# Patient Record
Sex: MALE | Race: WHITE | Employment: OTHER | URBAN - METROPOLITAN AREA
[De-identification: names, ages, dates, MRNs, and addresses within clinical notes are randomized per-mention and may not be internally consistent; named-entity substitution may affect disease eponyms.]

---

## 2020-09-01 ENCOUNTER — TELEPHONE (OUTPATIENT)
Dept: FAMILY MEDICINE CLINIC | Age: 60
End: 2020-09-01

## 2020-09-01 ENCOUNTER — OFFICE VISIT (OUTPATIENT)
Dept: FAMILY MEDICINE CLINIC | Age: 60
End: 2020-09-01
Payer: COMMERCIAL

## 2020-09-01 VITALS
OXYGEN SATURATION: 99 % | TEMPERATURE: 97.7 F | DIASTOLIC BLOOD PRESSURE: 73 MMHG | HEART RATE: 116 BPM | BODY MASS INDEX: 25.51 KG/M2 | SYSTOLIC BLOOD PRESSURE: 124 MMHG | RESPIRATION RATE: 24 BRPM | HEIGHT: 74 IN | WEIGHT: 198.8 LBS

## 2020-09-01 DIAGNOSIS — Z11.59 ENCOUNTER FOR HEPATITIS C SCREENING TEST FOR LOW RISK PATIENT: ICD-10-CM

## 2020-09-01 DIAGNOSIS — I63.9 CEREBROVASCULAR ACCIDENT (CVA), UNSPECIFIED MECHANISM (HCC): Primary | ICD-10-CM

## 2020-09-01 DIAGNOSIS — Z97.8 FOLEY CATHETER IN PLACE: ICD-10-CM

## 2020-09-01 DIAGNOSIS — N40.1 BENIGN PROSTATIC HYPERPLASIA WITH URINARY OBSTRUCTION: ICD-10-CM

## 2020-09-01 DIAGNOSIS — Z13.6 SCREENING FOR CARDIOVASCULAR CONDITION: ICD-10-CM

## 2020-09-01 DIAGNOSIS — Z79.4 CONTROLLED TYPE 2 DIABETES MELLITUS WITH COMPLICATION, WITH LONG-TERM CURRENT USE OF INSULIN (HCC): ICD-10-CM

## 2020-09-01 DIAGNOSIS — I73.9 PERIPHERAL ARTERIAL DISEASE (HCC): ICD-10-CM

## 2020-09-01 DIAGNOSIS — I95.9 HYPOTENSION, UNSPECIFIED HYPOTENSION TYPE: ICD-10-CM

## 2020-09-01 DIAGNOSIS — K59.00 CONSTIPATION, UNSPECIFIED CONSTIPATION TYPE: ICD-10-CM

## 2020-09-01 DIAGNOSIS — E11.8 CONTROLLED TYPE 2 DIABETES MELLITUS WITH COMPLICATION, WITH LONG-TERM CURRENT USE OF INSULIN (HCC): ICD-10-CM

## 2020-09-01 DIAGNOSIS — N13.8 BENIGN PROSTATIC HYPERPLASIA WITH URINARY OBSTRUCTION: ICD-10-CM

## 2020-09-01 LAB
EST. AVERAGE GLUCOSE BLD GHB EST-MCNC: 223 MG/DL
HBA1C MFR BLD: 9.4 % (ref 4–5.6)
PSA SERPL-MCNC: 2 NG/ML (ref 0.01–4)

## 2020-09-01 PROCEDURE — 99214 OFFICE O/P EST MOD 30 MIN: CPT | Performed by: FAMILY MEDICINE

## 2020-09-01 RX ORDER — INSULIN GLARGINE 100 [IU]/ML
INJECTION, SOLUTION SUBCUTANEOUS
Qty: 3 ADJUSTABLE DOSE PRE-FILLED PEN SYRINGE | Refills: 5 | Status: SHIPPED | OUTPATIENT
Start: 2020-09-01 | End: 2020-10-27 | Stop reason: SDUPTHER

## 2020-09-01 RX ORDER — METFORMIN HYDROCHLORIDE 850 MG/1
TABLET ORAL
COMMUNITY
Start: 2020-08-26 | End: 2020-09-01 | Stop reason: SDUPTHER

## 2020-09-01 RX ORDER — MIDODRINE HYDROCHLORIDE 5 MG/1
5 TABLET ORAL
Qty: 90 TAB | Refills: 1 | Status: SHIPPED | OUTPATIENT
Start: 2020-09-01 | End: 2020-09-15 | Stop reason: SDUPTHER

## 2020-09-01 RX ORDER — MIDODRINE HYDROCHLORIDE 5 MG/1
TABLET ORAL
COMMUNITY
Start: 2020-08-26 | End: 2020-09-01 | Stop reason: SDUPTHER

## 2020-09-01 RX ORDER — CLOPIDOGREL BISULFATE 75 MG/1
75 TABLET ORAL
COMMUNITY
Start: 2020-08-26 | End: 2020-09-01 | Stop reason: SDUPTHER

## 2020-09-01 RX ORDER — METFORMIN HYDROCHLORIDE 850 MG/1
TABLET ORAL
Qty: 180 TAB | Refills: 1 | Status: SHIPPED | OUTPATIENT
Start: 2020-09-01 | End: 2021-04-12

## 2020-09-01 RX ORDER — GLIPIZIDE 5 MG/1
TABLET ORAL
COMMUNITY
Start: 2020-08-26 | End: 2020-09-01 | Stop reason: SDUPTHER

## 2020-09-01 RX ORDER — SENNOSIDES 8.6 MG/1
1 TABLET ORAL DAILY
Qty: 90 TAB | Refills: 5 | Status: SHIPPED | OUTPATIENT
Start: 2020-09-01 | End: 2021-05-03

## 2020-09-01 RX ORDER — ASPIRIN 325 MG
325 TABLET ORAL DAILY
Qty: 90 TAB | Refills: 5 | Status: SHIPPED | OUTPATIENT
Start: 2020-09-01 | End: 2021-05-03

## 2020-09-01 RX ORDER — TAMSULOSIN HYDROCHLORIDE 0.4 MG/1
CAPSULE ORAL
Qty: 90 CAP | Refills: 1 | Status: SHIPPED | OUTPATIENT
Start: 2020-09-01 | End: 2020-12-30 | Stop reason: SDUPTHER

## 2020-09-01 RX ORDER — TAMSULOSIN HYDROCHLORIDE 0.4 MG/1
CAPSULE ORAL
COMMUNITY
Start: 2020-08-26 | End: 2020-09-01 | Stop reason: SDUPTHER

## 2020-09-01 RX ORDER — SENNOSIDES 8.6 MG/1
17.2 TABLET ORAL
COMMUNITY
Start: 2020-08-13 | End: 2020-09-01 | Stop reason: SDUPTHER

## 2020-09-01 RX ORDER — ASPIRIN 325 MG
325 TABLET ORAL
COMMUNITY
Start: 2020-08-13 | End: 2020-09-01 | Stop reason: SDUPTHER

## 2020-09-01 RX ORDER — GLIPIZIDE 5 MG/1
5 TABLET ORAL 2 TIMES DAILY
Qty: 180 TAB | Refills: 1 | Status: SHIPPED | OUTPATIENT
Start: 2020-09-01 | End: 2021-01-05 | Stop reason: SDUPTHER

## 2020-09-01 RX ORDER — CLOPIDOGREL BISULFATE 75 MG/1
75 TABLET ORAL DAILY
Qty: 90 TAB | Refills: 1 | Status: SHIPPED | OUTPATIENT
Start: 2020-09-01 | End: 2020-12-30 | Stop reason: SDUPTHER

## 2020-09-01 NOTE — TELEPHONE ENCOUNTER
Faxed medical records request to the following:     [facility]  F [place] H/O  [phone] [facility]  F     Dr. Diannia Mcardle [phone]

## 2020-09-01 NOTE — PROGRESS NOTES
Love Hickey is a 63 y.o. male who presents today with the following:    HPI  Chief Complaint   Patient presents with    New Patient     Stroke follow up  from [facility]     08/07/20          1. Cerebrovascular accident (CVA), unspecified mechanism ([facility]  Patient was admitted to [facility] on 8/7/2020 and discharged on 8/13/2022 [facility] rehab. He is seeing neurology at [facility]. I will get records. He reports he had a stent placed in carotid artery. Currently on aspirin and Plavix. 2. Controlled type 2 diabetes mellitus with complication, with long-term current use of insulin ([facility] [address]  Patient is currently taking metformin 850 mg twice a day and glipizide 5 mg twice a day. He reports he was previously on Lantus and sliding scale insulin. Home blood sugar log indicates fasting blood glucose in 190s200s. He is currently not on Lantus. He requests to start Lantus. 3. Peripheral arterial disease ([facility]  Patient reports he has stents placed in bilateral peripheral arteries of the lower extremities. He does not have a cardiologist.  I will get records and will refer to cardiology at next visit. Patient is currently on aspirin and Plavix. 4. Maier catheter in place  Maier catheter is in place. Patient was discharged with Maier from [facility] rehab. Patient has appointment with [place] [facility] today. 5. Benign prostatic hyperplasia with urinary obstruction  Patient has appointment with neurology. Requests refill of Flomax    6. Constipation, unspecified constipation type  Requests refill    7. Hypotension, unspecified hypotension type  He was prescribed midodrine 5 mg 3 times a day at the rehab for low blood pressures. He reports he is only taking Midrin with breakfast.  He requests a change prescription. Review of Systems   Constitutional: Negative. HENT: Negative. Eyes: Negative. Respiratory: Negative. Cardiovascular: Negative. Gastrointestinal: Negative.     Genitourinary: Negative. Musculoskeletal: Negative. Skin: Negative. Neurological: Positive for weakness. Status post stroke   Endo/Heme/Allergies: Negative. Psychiatric/Behavioral: Negative. Physical Exam  Vitals signs and nursing note reviewed. HENT:      Head: Normocephalic and atraumatic. Right Ear: External ear normal.      Left Ear: External ear normal.   Eyes:      Conjunctiva/sclera: Conjunctivae normal.   Neck:      Musculoskeletal: Normal range of motion. Cardiovascular:      Rate and Rhythm: Normal rate and regular rhythm. Pulmonary:      Effort: Pulmonary effort is normal.      Breath sounds: Normal breath sounds. Abdominal:      General: Bowel sounds are normal. There is no distension. Palpations: Abdomen is soft. Musculoskeletal: Normal range of motion. Skin:     General: Skin is warm and dry. Neurological:      Mental Status: He is alert and oriented to person, place, and time. Psychiatric:         Mood and Affect: Mood and affect normal.       /73   Pulse (!) 116   Temp 97.7 °F (36.5 °C) (Oral)   Resp 24   Ht 6' 2\" (1.88 m)   Wt 198 lb 12.8 oz (90.2 kg)   SpO2 99%   BMI 25.52 kg/m²     Allergies   Allergen Reactions    Latex Swelling    Banana Swelling       Current Outpatient Medications   Medication Sig    insulin glargine (LANTUS,BASAGLAR) 100 unit/mL (3 mL) inpn 5 units sq daily with dinner  Indications: type 2 diabetes mellitus    clopidogreL (PLAVIX) 75 mg tab Take 1 Tab by mouth daily.  aspirin (ASPIRIN) 325 mg tablet Take 1 Tab by mouth daily.  glipiZIDE (GLUCOTROL) 5 mg tablet Take 1 Tab by mouth two (2) times a day.  metFORMIN (GLUCOPHAGE) 850 mg tablet TAKE 1 TABLET BY MOUTH TWICE A DAY WITH MEALS    midodrine (PROAMATINE) 5 mg tablet Take 1 Tab by mouth daily (with breakfast).  senna (SENOKOT) 8.6 mg tablet Take 1 Tab by mouth daily.  Indications: NOT TAKING    tamsulosin (FLOMAX) 0.4 mg capsule TAKE 1 CAPSULE BY MOUTH AFTER BREAKFAST     No current facility-administered medications for this visit. Past Medical History:   Diagnosis Date    Diabetes ([facility]     Hypotension     Stroke [facility])        Past Surgical History:   Procedure Laterality Date    VASCULAR SURGERY PROCEDURE UNLIST  08/2020    Carotid Dopler            No results found for this visit on 09/01/20. 1. Cerebrovascular accident (CVA), unspecified mechanism ([facility] [address]    - THYROID CASCADE PROFILE; Future    2. Controlled type 2 diabetes mellitus with complication, with long-term current use of insulin (HCC)  Start Lantus in addition to metformin and glipizide. - insulin glargine (LANTUS,BASAGLAR) 100 unit/mL (3 mL) inpn; 5 units sq daily with dinner  Indications: type 2 diabetes mellitus  Dispense: 3 Adjustable Dose Pre-filled Pen Syringe; Refill: 5  - glipiZIDE (GLUCOTROL) 5 mg tablet; Take 1 Tab by mouth two (2) times a day. Dispense: 180 Tab; Refill: 1  - metFORMIN (GLUCOPHAGE) 850 mg tablet; TAKE 1 TABLET BY MOUTH TWICE A DAY WITH MEALS  Dispense: 180 Tab; Refill: 1  - HEMOGLOBIN A1C WITH EAG; Future  - METABOLIC PANEL, COMPREHENSIVE; Future  - MICROALBUMIN, UR, RAND W/ MICROALB/CREAT RATIO; Future  - THYROID CASCADE PROFILE; Future    3. Peripheral arterial disease (HCC)    - CBC WITH AUTOMATED DIFF; Future    4. Maier catheter in place    - URINALYSIS W/ RFLX MICROSCOPIC; Future    5. Benign prostatic hyperplasia with urinary obstruction    - clopidogreL (PLAVIX) 75 mg tab; Take 1 Tab by mouth daily. Dispense: 90 Tab; Refill: 1  - aspirin (ASPIRIN) 325 mg tablet; Take 1 Tab by mouth daily. Dispense: 90 Tab; Refill: 5  - tamsulosin (FLOMAX) 0.4 mg capsule; TAKE 1 CAPSULE BY MOUTH AFTER BREAKFAST  Dispense: 90 Cap; Refill: 1  - PSA, DIAGNOSTIC (PROSTATE SPECIFIC AG); Future    6. Constipation, unspecified constipation type    - senna (SENOKOT) 8.6 mg tablet; Take 1 Tab by mouth daily. Indications: NOT TAKING  Dispense: 90 Tab; Refill: 5    7. Hypotension, unspecified hypotension type    - midodrine (PROAMATINE) 5 mg tablet; Take 1 Tab by mouth daily (with breakfast). Dispense: 90 Tab; Refill: 1    8. Encounter for hepatitis C screening test for low risk patient    - HEPATITIS C QT BY PCR WITH REFLEX GENOTYPE; Future    9. Screening for cardiovascular condition    - LIPID PANEL; Future        Follow-up and Dispositions    · Return in about 2 weeks (around 9/15/2020) for follow up, discuss labs, BP, DIABETES. I ADVISED PATIENT TO GO TO ER IF SYMPTOMS WORSEN , CHANGE OR FAILS TO IMPROVE. I have discussed the diagnosis with the patient and the intended plan as seen in the above orders. The patient has received an after-visit summary and questions were answered concerning future plans. I have discussed medication side effects and warnings with the patient as well. The patient agrees and understands above plan.            Gretchen Castro MD

## 2020-09-01 NOTE — PROGRESS NOTES
Patient stated name &     Chief Complaint   Patient presents with    New Patient     Stroke follow up  from [facility]     20           Health Maintenance Due   Topic    Hepatitis C Screening     DTaP/Tdap/Td series (1 - Tdap)    Lipid Screen     Shingrix Vaccine Age 50> (1 of 2)    FOBT Q1Y Age 54-65     Flu Vaccine (1)       Wt Readings from Last 3 Encounters:   20 198 lb 12.8 oz (90.2 kg)     Temp Readings from Last 3 Encounters:   20 97.7 °F (36.5 °C) (Oral)     BP Readings from Last 3 Encounters:   20 124/73     Pulse Readings from Last 3 Encounters:   20 (!) 116         Learning Assessment:  :     No flowsheet data found. Depression Screening:  :     3 most recent PHQ Screens 2020   Little interest or pleasure in doing things Not at all   Feeling down, depressed, irritable, or hopeless Not at all   Total Score PHQ 2 0       Fall Risk Assessment:  :     No flowsheet data found. Abuse Screening:  :     No flowsheet data found. Coordination of Care Questionnaire:  :     1) Have you been to an emergency room, urgent care clinic since your last visit? No    Hospitalized since your last visit? Yes [facility]  Stroke 2020             2) Have you seen or consulted any other health care providers outside of [address] [place] [address] since your last visit? No  (Include any pap smears or colon screenings in this section.)  no    Patient is accompanied by daughter I have received verbal consent from [facility] to discuss any/all medical information while they are present in the room.

## 2020-09-02 PROBLEM — I77.9 PERIPHERAL ARTERIAL OCCLUSIVE DISEASE (HCC): Status: ACTIVE | Noted: 2020-09-02

## 2020-09-02 PROBLEM — Z95.828 HISTORY OF COMMON CAROTID ARTERY STENT PLACEMENT: Status: ACTIVE | Noted: 2020-09-02

## 2020-09-02 PROBLEM — Z86.73: Status: ACTIVE | Noted: 2020-09-02

## 2020-09-02 PROBLEM — E11.9 DIABETES MELLITUS (HCC): Status: ACTIVE | Noted: 2020-09-02

## 2020-09-02 PROBLEM — Z98.62 H/O CAROTID ANGIOPLASTY: Status: ACTIVE | Noted: 2020-09-02

## 2020-09-02 PROBLEM — Z98.890 HISTORY OF COMMON CAROTID ARTERY STENT PLACEMENT: Status: ACTIVE | Noted: 2020-09-02

## 2020-09-02 PROBLEM — S06.9XAA BRAIN INJURY: Status: ACTIVE | Noted: 2020-09-02

## 2020-09-02 PROBLEM — Z98.890 HISTORY OF ANKLE SURGERY: Status: ACTIVE | Noted: 2020-09-02

## 2020-09-08 LAB
ALBUMIN SERPL-MCNC: 3.8 G/DL (ref 3.5–5)
ALBUMIN/GLOB SERPL: 1.1 {RATIO} (ref 1.1–2.2)
ALP SERPL-CCNC: 82 U/L (ref 45–117)
ALT SERPL-CCNC: 28 U/L (ref 12–78)
ANION GAP SERPL CALC-SCNC: 8 MMOL/L (ref 5–15)
AST SERPL-CCNC: 20 U/L (ref 15–37)
BASOPHILS # BLD: 0.2 K/UL (ref 0–0.1)
BASOPHILS NFR BLD: 2 % (ref 0–1)
BILIRUB SERPL-MCNC: 1.3 MG/DL (ref 0.2–1)
BUN SERPL-MCNC: 16 MG/DL (ref 6–20)
BUN/CREAT SERPL: 19 (ref 12–20)
CALCIUM SERPL-MCNC: 9 MG/DL (ref 8.5–10.1)
CHLORIDE SERPL-SCNC: 103 MMOL/L (ref 97–108)
CHOLEST SERPL-MCNC: 146 MG/DL
CO2 SERPL-SCNC: 27 MMOL/L (ref 21–32)
CREAT SERPL-MCNC: 0.83 MG/DL (ref 0.7–1.3)
DIFFERENTIAL METHOD BLD: ABNORMAL
EOSINOPHIL # BLD: 0.4 K/UL (ref 0–0.4)
EOSINOPHIL NFR BLD: 5 % (ref 0–7)
ERYTHROCYTE [DISTWIDTH] IN BLOOD BY AUTOMATED COUNT: 15.7 % (ref 11.5–14.5)
GLOBULIN SER CALC-MCNC: 3.5 G/DL (ref 2–4)
GLUCOSE SERPL-MCNC: 121 MG/DL (ref 65–100)
HCT VFR BLD AUTO: 38.9 % (ref 36.6–50.3)
HCV GENTYP SERPL NAA+PROBE: NORMAL
HDLC SERPL-MCNC: 61 MG/DL
HDLC SERPL: 2.4 {RATIO} (ref 0–5)
HGB BLD-MCNC: 12.5 G/DL (ref 12.1–17)
IMM GRANULOCYTES # BLD AUTO: 0.1 K/UL (ref 0–0.04)
IMM GRANULOCYTES NFR BLD AUTO: 1 % (ref 0–0.5)
INTERPRETIVE COMMENT, 010391: NORMAL
LDLC SERPL CALC-MCNC: 67.8 MG/DL (ref 0–100)
LIPID PROFILE,FLP: NORMAL
LYMPHOCYTES # BLD: 2.1 K/UL (ref 0.8–3.5)
LYMPHOCYTES NFR BLD: 25 % (ref 12–49)
MCH RBC QN AUTO: 30 PG (ref 26–34)
MCHC RBC AUTO-ENTMCNC: 32.1 G/DL (ref 30–36.5)
MCV RBC AUTO: 93.3 FL (ref 80–99)
MONOCYTES # BLD: 0.6 K/UL (ref 0–1)
MONOCYTES NFR BLD: 7 % (ref 5–13)
NEUTS SEG # BLD: 4.8 K/UL (ref 1.8–8)
NEUTS SEG NFR BLD: 60 % (ref 32–75)
NRBC # BLD: 0 K/UL (ref 0–0.01)
NRBC BLD-RTO: 0 PER 100 WBC
PLATELET # BLD AUTO: ABNORMAL K/UL (ref 150–400)
PLEASE NOTE, 550474: NORMAL
POTASSIUM SERPL-SCNC: 4.2 MMOL/L (ref 3.5–5.1)
PROT SERPL-MCNC: 7.3 G/DL (ref 6.4–8.2)
RBC # BLD AUTO: 4.17 M/UL (ref 4.1–5.7)
RBC MORPH BLD: ABNORMAL
SODIUM SERPL-SCNC: 138 MMOL/L (ref 136–145)
T4 FREE SERPL-MCNC: 1.34 NG/DL (ref 0.82–1.77)
TRIGL SERPL-MCNC: 86 MG/DL (ref ?–150)
TRIIODOTHYRONINE,FREE, 010392: 3.1 PG/ML (ref 2–4.4)
TSH SERPL-ACNC: 0.31 UIU/ML (ref 0.45–4.5)
VLDLC SERPL CALC-MCNC: 17.2 MG/DL
WBC # BLD AUTO: 8.2 K/UL (ref 4.1–11.1)

## 2020-09-15 ENCOUNTER — OFFICE VISIT (OUTPATIENT)
Dept: FAMILY MEDICINE CLINIC | Age: 60
End: 2020-09-15
Payer: COMMERCIAL

## 2020-09-15 VITALS
TEMPERATURE: 98.6 F | WEIGHT: 234 LBS | HEIGHT: 74 IN | DIASTOLIC BLOOD PRESSURE: 51 MMHG | OXYGEN SATURATION: 98 % | BODY MASS INDEX: 30.03 KG/M2 | HEART RATE: 123 BPM | SYSTOLIC BLOOD PRESSURE: 71 MMHG | RESPIRATION RATE: 16 BRPM

## 2020-09-15 DIAGNOSIS — I95.9 HYPOTENSION, UNSPECIFIED HYPOTENSION TYPE: ICD-10-CM

## 2020-09-15 DIAGNOSIS — E11.8 CONTROLLED TYPE 2 DIABETES MELLITUS WITH COMPLICATION, WITH LONG-TERM CURRENT USE OF INSULIN (HCC): Primary | ICD-10-CM

## 2020-09-15 DIAGNOSIS — Z79.4 CONTROLLED TYPE 2 DIABETES MELLITUS WITH COMPLICATION, WITH LONG-TERM CURRENT USE OF INSULIN (HCC): Primary | ICD-10-CM

## 2020-09-15 DIAGNOSIS — E05.90 HYPERTHYROIDISM: ICD-10-CM

## 2020-09-15 DIAGNOSIS — R17 ELEVATED BILIRUBIN: ICD-10-CM

## 2020-09-15 PROCEDURE — 99213 OFFICE O/P EST LOW 20 MIN: CPT | Performed by: FAMILY MEDICINE

## 2020-09-15 RX ORDER — MIDODRINE HYDROCHLORIDE 5 MG/1
5 TABLET ORAL
Qty: 90 TAB | Refills: 5 | Status: SHIPPED | OUTPATIENT
Start: 2020-09-15 | End: 2021-05-03

## 2020-09-15 NOTE — PROGRESS NOTES
Betty Dumont is a 63 y.o. male who presents today with the following:    HPI  Chief Complaint   Patient presents with    Medication Refill       1. Controlled type 2 diabetes mellitus with complication, with long-term current use of insulin ([address]  Diabetes  Patient presents today for diabetes follow up. Pt. current diabetic medications include Lantus 5 units daily with dinner and glipizide 5 mg twice daily and metformin 850 twice daily. Taking medication as prescribed. Current monitoring regimen: home blood tests -1 times daily. Home blood sugar records: fasting range: 150s190s. Any episodes of hypoglycemia? no. Known diabetic complications: none. Cardiovascular risk factors: diabetes mellitus, male gender. currently on ASA. not taking statin, ACE/ARB. Diabetic Foot and Eye Exam HM Status   Topic Date Due    Diabetic Foot Care  07/24/1970    Eye Exam  07/24/1970       There is no immunization history on file for this patient. No results found for: MCACR, MCA1, MCA2, MCA3, MCAU, MCAU2, MCALPOCT  Lab Results   Component Value Date/Time    Hemoglobin A1c 9.4 (H) 09/01/2020 12:34 PM           2. Hypotension, unspecified hypotension type  He was prescribed midodrine 5 mg 3 times a day at the rehab for low blood pressures. He reports he is only taking Midrin with breakfast.  This is recommended by his urologist as they think that midodrine is causing his urinary retention. His prescription for midodrine was changed to once daily per patient request at last visit. He is hypotensive today but asymptomatic. He denies dizziness. 3. Hyperthyroidism  TSH  0.308      I will recheck thyroid panel once hypotension is improved. Patient is not currently on any thyroid medication. Discussed this plan with patient. 4. Elevated bilirubin  Total bilirubin is mildly elevated we will continue to monitor. Recheck at next lab work.     Bilirubin, total  1.3             Review of Systems   Constitutional: Negative. HENT: Negative. Eyes: Negative. Respiratory: Negative. Cardiovascular: Negative. Gastrointestinal: Negative. Genitourinary: Negative. Urinary retention, catheter in place   Musculoskeletal: Negative. Skin: Negative. Neurological: Positive for weakness. Negative for dizziness. Status post stroke   Endo/Heme/Allergies: Negative. Psychiatric/Behavioral: Negative. Physical Exam  Vitals signs and nursing note reviewed. HENT:      Head: Normocephalic and atraumatic. Right Ear: External ear normal.      Left Ear: External ear normal.      Nose: Nose normal.   Eyes:      Conjunctiva/sclera: Conjunctivae normal.   Neck:      Musculoskeletal: Normal range of motion and neck supple. Cardiovascular:      Rate and Rhythm: Normal rate and regular rhythm. Pulmonary:      Effort: Pulmonary effort is normal.      Breath sounds: Normal breath sounds. Abdominal:      General: Bowel sounds are normal. There is no distension. Palpations: Abdomen is soft. Tenderness: There is no abdominal tenderness. Musculoskeletal: Normal range of motion. Skin:     General: Skin is warm and dry. Neurological:      Mental Status: He is alert and oriented to person, place, and time. Psychiatric:         Mood and Affect: Mood and affect normal.       BP (!) 71/51   Pulse (!) 123   Temp 98.6 °F (37 °C) (Oral)   Resp 16   Ht 6' 2\" (1.88 m)   Wt 234 lb (106.1 kg)   SpO2 98%   BMI 30.04 kg/m²     Allergies   Allergen Reactions    Latex Swelling    Banana Swelling       Current Outpatient Medications   Medication Sig    Insulin Needles, Disposable, 30 gauge x 1/3\" Take insulin as prescribed    midodrine (PROAMATINE) 5 mg tablet Take 1 Tab by mouth three (3) times daily (with meals).     insulin glargine (LANTUS,BASAGLAR) 100 unit/mL (3 mL) inpn 5 units sq daily with dinner  Indications: type 2 diabetes mellitus    clopidogreL (PLAVIX) 75 mg tab Take 1 Tab by mouth daily.  aspirin (ASPIRIN) 325 mg tablet Take 1 Tab by mouth daily.  glipiZIDE (GLUCOTROL) 5 mg tablet Take 1 Tab by mouth two (2) times a day.  metFORMIN (GLUCOPHAGE) 850 mg tablet TAKE 1 TABLET BY MOUTH TWICE A DAY WITH MEALS    senna (SENOKOT) 8.6 mg tablet Take 1 Tab by mouth daily. Indications: NOT TAKING    tamsulosin (FLOMAX) 0.4 mg capsule TAKE 1 CAPSULE BY MOUTH AFTER BREAKFAST     No current facility-administered medications for this visit. Past Medical History:   Diagnosis Date    Arterial ischemic stroke, MCA, right, remote, resolved 9/2/2020    Diabetes ([facility] [address]     H/O carotid angioplasty 9/2/2020    RIGHT    History of common carotid artery stent placement 9/2/2020    RIGHT    Hypotension     Stroke [facility])        Past Surgical History:   Procedure Laterality Date    VASCULAR SURGERY PROCEDURE UNLIST  08/2020    Carotid Dopler       Problem List  Date Reviewed: 9/15/2020          Codes Class Noted    History of ankle surgery ICD-10-CM: Z98.890  ICD-9-CM: V45.89  9/2/2020        Peripheral arterial occlusive disease ([facility] ICD-10-CM: I77.9  ICD-9-CM: 444.22  9/2/2020        Diabetes mellitus ([facility] ICD-10-CM: E11.9  ICD-9-CM: 250.00  9/2/2020        H/O carotid angioplasty ICD-10-CM: Z98.62  ICD-9-CM: V45.89  9/2/2020    Overview Signed 9/2/2020 10:55 AM by Bianca Peraza MD     RIGHT             History of common carotid artery stent placement ICD-10-CM: Z98.890, D02.474  ICD-9-CM: V45.89  9/2/2020    Overview Signed 9/2/2020 10:56 AM by Bianca Peraza MD     RIGHT             Arterial ischemic stroke, MCA, right, remote, resolved ICD-10-CM: Z86.73  ICD-9-CM: V12.54  9/2/2020        Brain injury ([facility] ICD-10-CM: S06. 9X9A  ICD-9-CM: 854.00  9/2/2020               No results found for this visit on 09/15/20. 1. Controlled type 2 diabetes mellitus with complication, with long-term current use of insulin (HCC)    - Insulin Needles, Disposable, 30 gauge x 1/3\";  Take insulin as prescribed  Dispense: 100 Pen Needle; Refill: 11  - REFERRAL TO DIABETIC EDUCATION; Standing    2. Hypotension, unspecified hypotension type  I discussed with patient to go to ER for hypotension, patient is asymptomatic at this time. I offered to call EMS. Patient declined to go to ER. I offered to talk to his daughter on the phone to inform her about his hypotension but patient declined. He said he will go home and take his midodrine and keep checking blood pressure, if he becomes symptomatic or blood pressure does not improve he will go to ER. I have changed his Midrin to 3 times a day. Patient agrees to change Midrin to 3 times daily until seen by cardiology. - midodrine (PROAMATINE) 5 mg tablet; Take 1 Tab by mouth three (3) times daily (with meals). Dispense: 90 Tab; Refill: 5  - REFERRAL TO EMERGENCY DEPARTMENT  - REFERRAL TO CARDIOLOGY    3. Hyperthyroidism  I will check thyroid labs at next visit. 4. Elevated bilirubin  Repeat CMP at next visit        Follow-up and Dispositions    · Return in about 2 weeks (around 9/29/2020) for follow up, BP. I ADVISED PATIENT TO GO TO ER IF SYMPTOMS WORSEN , CHANGE OR FAILS TO IMPROVE. I have discussed the diagnosis with the patient and the intended plan as seen in the above orders. The patient has received an after-visit summary and questions were answered concerning future plans. I have discussed medication side effects and warnings with the patient as well. The patient agrees and understands above plan.            Kate Tolbert MD

## 2020-09-15 NOTE — PROGRESS NOTES
Identified pt with two pt identifiers(name and ). Reviewed record in preparation for visit and have obtained necessary documentation. Chief Complaint   Patient presents with    Medication Refill        Health Maintenance Due   Topic    Pneumococcal 0-64 years (1 of 1 - PPSV23)    Foot Exam Q1     MICROALBUMIN Q1     Eye Exam Retinal or Dilated     DTaP/Tdap/Td series (1 - Tdap)    Shingrix Vaccine Age 50> (1 of 2)    FOBT Q1Y Age 54-65     Flu Vaccine (1)       Visit Vitals  Blood Pressure (Abnormal) 84/63 (BP 1 Location: Left arm, BP Patient Position: Sitting)   Pulse (Abnormal) 119   Temperature 98.6 °F (37 °C) (Oral)   Respiration 16   Height 6' 2\" (1.88 m)   Weight 234 lb (106.1 kg)   Oxygen Saturation 98%   Body Mass Index 30.04 kg/m²         Coordination of Care Questionnaire:  :   1) Have you been to an emergency room, urgent care, or hospitalized since your last visit? NO      2. Have seen or consulted any other health care provider since your last visit? NO          Patient is accompanied by  I have received verbal consent from Lizzeth Chino to discuss any/all medical information while they are present in the room.

## 2020-09-29 ENCOUNTER — OFFICE VISIT (OUTPATIENT)
Dept: FAMILY MEDICINE CLINIC | Age: 60
End: 2020-09-29
Payer: COMMERCIAL

## 2020-09-29 VITALS
RESPIRATION RATE: 16 BRPM | SYSTOLIC BLOOD PRESSURE: 103 MMHG | BODY MASS INDEX: 25.28 KG/M2 | DIASTOLIC BLOOD PRESSURE: 72 MMHG | OXYGEN SATURATION: 99 % | TEMPERATURE: 97.5 F | HEIGHT: 74 IN | HEART RATE: 85 BPM | WEIGHT: 197 LBS

## 2020-09-29 DIAGNOSIS — Z12.11 SCREEN FOR COLON CANCER: ICD-10-CM

## 2020-09-29 DIAGNOSIS — Z13.5 SCREENING FOR GLAUCOMA: ICD-10-CM

## 2020-09-29 DIAGNOSIS — E11.51 TYPE 2 DIABETES MELLITUS WITH PERIPHERAL VASCULAR DISEASE (HCC): Primary | ICD-10-CM

## 2020-09-29 DIAGNOSIS — R17 ELEVATED BILIRUBIN: ICD-10-CM

## 2020-09-29 DIAGNOSIS — Z74.09 IMPAIRED MOBILITY: ICD-10-CM

## 2020-09-29 DIAGNOSIS — E05.90 HYPERTHYROIDISM: ICD-10-CM

## 2020-09-29 LAB
ALBUMIN SERPL-MCNC: 3.8 G/DL (ref 3.5–5)
ALBUMIN/GLOB SERPL: 1 {RATIO} (ref 1.1–2.2)
ALP SERPL-CCNC: 104 U/L (ref 45–117)
ALT SERPL-CCNC: 20 U/L (ref 12–78)
ANION GAP SERPL CALC-SCNC: 7 MMOL/L (ref 5–15)
AST SERPL-CCNC: 11 U/L (ref 15–37)
BILIRUB SERPL-MCNC: 0.9 MG/DL (ref 0.2–1)
BUN SERPL-MCNC: 14 MG/DL (ref 6–20)
BUN/CREAT SERPL: 18 (ref 12–20)
CALCIUM SERPL-MCNC: 9.7 MG/DL (ref 8.5–10.1)
CHLORIDE SERPL-SCNC: 104 MMOL/L (ref 97–108)
CO2 SERPL-SCNC: 27 MMOL/L (ref 21–32)
CREAT SERPL-MCNC: 0.76 MG/DL (ref 0.7–1.3)
GLOBULIN SER CALC-MCNC: 4 G/DL (ref 2–4)
GLUCOSE SERPL-MCNC: 138 MG/DL (ref 65–100)
HAV IGM SER QL: NONREACTIVE
HBV CORE IGM SER QL: NONREACTIVE
HBV SURFACE AG SER QL: <0.1 INDEX
HBV SURFACE AG SER QL: NEGATIVE
HCV AB SERPL QL IA: NONREACTIVE
HCV COMMENT,HCGAC: NORMAL
POTASSIUM SERPL-SCNC: 4.3 MMOL/L (ref 3.5–5.1)
PROT SERPL-MCNC: 7.8 G/DL (ref 6.4–8.2)
SODIUM SERPL-SCNC: 138 MMOL/L (ref 136–145)
SP1: NORMAL
SP2: NORMAL
SP3: NORMAL

## 2020-09-29 PROCEDURE — 99214 OFFICE O/P EST MOD 30 MIN: CPT | Performed by: FAMILY MEDICINE

## 2020-09-29 RX ORDER — INSULIN ADMIN. SUPPLIES
INSULIN PEN (EA) SUBCUTANEOUS
COMMUNITY
Start: 2020-09-15

## 2020-09-29 NOTE — PROGRESS NOTES
Identified pt with two pt identifiers(name and ). Reviewed record in preparation for visit and have obtained necessary documentation. Chief Complaint   Patient presents with    Diabetes     x2 week f/u     Other     Pt requesting a referral for Cardiologist     Other     Discuss handicape parking        Health Maintenance Due   Topic    Statin Therapy     Pneumococcal 0-64 years (1 of 1 - PPSV23)    Foot Exam Q1     MICROALBUMIN Q1     Eye Exam Retinal or Dilated     DTaP/Tdap/Td series (1 - Tdap)    Shingrix Vaccine Age 50> (1 of 2)    FOBT Q1Y Age 54-65     Flu Vaccine (1)       Coordination of Care Questionnaire:  :   1) Have you been to an emergency room, urgent care, or hospitalized since your last visit? If yes, where when, and reason for visit? no      2. Have seen or consulted any other health care provider since your last visit? If yes, where when, and reason for visit?  no        Patient is accompanied by self I have received verbal consent from Siddharth Hernandez to discuss any/all medical information while they are present in the room.

## 2020-09-29 NOTE — PROGRESS NOTES
Sukhdeep Hayden is a 63 y.o. male who presents today with the following:    HPI  Chief Complaint   Patient presents with    Diabetes     x2 week f/u     Other     Pt requesting a referral for Cardiologist     Other     Discuss handicape parking       1. Screen for colon cancer  Requests referral for colonoscopy. 2. Hyperthyroidism  Patient is here to follow-up on abnormal TSH. I will recheck labs today. Last TSH reviewed:   Lab Results   Component Value Date/Time    TSH 0.308 (L) 09/01/2020 12:34 PM         3. Elevated bilirubin  Bilirubin was mildly elevated at last lab check. I will repeat labs today. 4. Type 2 diabetes mellitus with peripheral vascular disease ([facility]  Did not bring his blood glucose log today. Reports fasting blood sugar was 118 today. It ranges from 118120. He reports taking Lantus glipizide and metformin as prescribed. 5. Impaired mobility  He requests handicap parking forms for [employer] filled out today. He walks with a cane and walker. Patient is status post stroke and has an indwelling Maier's catheter. Review of Systems   Constitutional: Negative. HENT: Negative. Eyes: Negative. Respiratory: Negative. Cardiovascular: Negative. Gastrointestinal: Negative. Genitourinary: Negative. Urinary retention, catheter in place   Musculoskeletal: Negative. Skin: Negative. Neurological: Positive for weakness. Negative for dizziness. Status post stroke   Endo/Heme/Allergies: Negative. Psychiatric/Behavioral: Negative. Physical Exam  Vitals signs and nursing note reviewed. HENT:      Head: Normocephalic and atraumatic. Right Ear: External ear normal.      Left Ear: External ear normal.      Nose: Nose normal.   Eyes:      Conjunctiva/sclera: Conjunctivae normal.   Neck:      Musculoskeletal: Normal range of motion. Cardiovascular:      Rate and Rhythm: Normal rate and regular rhythm.    Pulmonary:      Effort: Pulmonary effort is normal.   Abdominal:      General: Bowel sounds are normal.      Palpations: Abdomen is soft. Musculoskeletal: Normal range of motion. Skin:     General: Skin is warm and dry. Neurological:      Mental Status: He is alert and oriented to person, place, and time. Mental status is at baseline. Psychiatric:         Mood and Affect: Mood and affect normal.       /72 (BP 1 Location: Left arm, BP Patient Position: Sitting)   Pulse 85   Temp 97.5 °F (36.4 °C) (Oral)   Resp 16   Ht 6' 2\" (1.88 m)   Wt 197 lb (89.4 kg)   SpO2 99%   BMI 25.29 kg/m²     Allergies   Allergen Reactions    Latex Swelling    Banana Swelling       Current Outpatient Medications   Medication Sig    Novofine Autocover 30 gauge x 1/3\" ndle TAKE INSULIN AS PRESCRIBED    Insulin Needles, Disposable, 30 gauge x 1/3\" Take insulin as prescribed    midodrine (PROAMATINE) 5 mg tablet Take 1 Tab by mouth three (3) times daily (with meals).  insulin glargine (LANTUS,BASAGLAR) 100 unit/mL (3 mL) inpn 5 units sq daily with dinner  Indications: type 2 diabetes mellitus    clopidogreL (PLAVIX) 75 mg tab Take 1 Tab by mouth daily.  aspirin (ASPIRIN) 325 mg tablet Take 1 Tab by mouth daily.  glipiZIDE (GLUCOTROL) 5 mg tablet Take 1 Tab by mouth two (2) times a day.  metFORMIN (GLUCOPHAGE) 850 mg tablet TAKE 1 TABLET BY MOUTH TWICE A DAY WITH MEALS    senna (SENOKOT) 8.6 mg tablet Take 1 Tab by mouth daily. Indications: NOT TAKING    tamsulosin (FLOMAX) 0.4 mg capsule TAKE 1 CAPSULE BY MOUTH AFTER BREAKFAST     No current facility-administered medications for this visit.         Past Medical History:   Diagnosis Date    Arterial ischemic stroke, MCA, right, remote, resolved 9/2/2020    Diabetes ([facility] [address]     H/O carotid angioplasty 9/2/2020    RIGHT    History of common carotid artery stent placement 9/2/2020    RIGHT    Hypotension     Stroke [facility])        Past Surgical History:   Procedure Laterality Date    VASCULAR SURGERY PROCEDURE UNLIST  08/2020    Carotid Dopler       Problem List  Date Reviewed: 9/29/2020          Codes Class Noted    Type 2 diabetes mellitus with peripheral vascular disease ([facility] ICD-10-CM: E11.51  ICD-9-CM: 250.70, 443.81  9/29/2020        History of ankle surgery ICD-10-CM: Z98.890  ICD-9-CM: V45.89  9/2/2020        Peripheral arterial occlusive disease ([facility] ICD-10-CM: I77.9  ICD-9-CM: 444.22  9/2/2020        Diabetes mellitus ([facility] ICD-10-CM: E11.9  ICD-9-CM: 250.00  9/2/2020        H/O carotid angioplasty ICD-10-CM: Z98.62  ICD-9-CM: V45.89  9/2/2020    Overview Signed 9/2/2020 10:55 AM by Anthony Ibarra MD     RIGHT             History of common carotid artery stent placement ICD-10-CM: Z98.890, Z46.844  ICD-9-CM: V45.89  9/2/2020    Overview Signed 9/2/2020 10:56 AM by Anthony Ibarra MD     RIGHT             Arterial ischemic stroke, MCA, right, remote, resolved ICD-10-CM: Z86.73  ICD-9-CM: V12.54  9/2/2020        Brain injury ([facility] ICD-10-CM: S06. 9X9A  ICD-9-CM: 854.00  9/2/2020               No results found for this visit on 09/29/20.      1. Screen for colon cancer    - REFERRAL TO GASTROENTEROLOGY    2. Hyperthyroidism    - THYROID CASCADE PROFILE; Future    3. Elevated bilirubin    - HEPATITIS PANEL, ACUTE; Future  - METABOLIC PANEL, COMPREHENSIVE; Future    4. Type 2 diabetes mellitus with peripheral vascular disease (HCC)    - REFERRAL TO OPHTHALMOLOGY    5. Impaired mobility  [employer] handicap parking forms filled out and given to patient. 6. Screening for glaucoma    - REFERRAL TO OPHTHALMOLOGY        Follow-up and Dispositions    · Return in about 3 months (around 12/29/2020) for follow up. I ADVISED PATIENT TO GO TO ER IF SYMPTOMS WORSEN , CHANGE OR FAILS TO IMPROVE. I have discussed the diagnosis with the patient and the intended plan as seen in the above orders. The patient has received an after-visit summary and questions were answered concerning future plans. I have discussed medication side effects and warnings with the patient as well. The patient agrees and understands above plan.            Flaco Chatterjee MD

## 2020-09-30 DIAGNOSIS — E05.90 HYPERTHYROIDISM: Primary | ICD-10-CM

## 2020-09-30 LAB
T4 FREE SERPL-MCNC: 1.98 NG/DL (ref 0.82–1.77)
TSH SERPL-ACNC: 0.01 UIU/ML (ref 0.45–4.5)

## 2020-09-30 NOTE — PROGRESS NOTES
Please call and inform patient TSH is low and T4 is elevated. I have ordered ultrasound of thyroid. Please give information for particular imaging centers to patient. Patient to follow-up after ultrasound.   Thanks

## 2020-10-06 ENCOUNTER — TELEPHONE (OUTPATIENT)
Dept: FAMILY MEDICINE CLINIC | Age: 60
End: 2020-10-06

## 2020-10-06 NOTE — TELEPHONE ENCOUNTER
I left a message on Mrs. Darryle Butte' v/m to return our call. Please call and inform patient TSH is low and T4 is elevated. I have ordered ultrasound of thyroid. Please give information for particular imaging centers to patient. Patient to follow-up after ultrasound.   Thanks Dr. Erlinda Andrea

## 2020-10-08 ENCOUNTER — TELEPHONE (OUTPATIENT)
Dept: FAMILY MEDICINE CLINIC | Age: 60
End: 2020-10-08

## 2020-10-08 NOTE — TELEPHONE ENCOUNTER
Please call and inform patient, I have received short-term disability paperwork, patient needs appointment to fill out forms. Please schedule.   Thanks

## 2020-10-09 ENCOUNTER — TRANSCRIBE ORDER (OUTPATIENT)
Dept: FAMILY MEDICINE CLINIC | Age: 60
End: 2020-10-09

## 2020-10-09 ENCOUNTER — TELEPHONE (OUTPATIENT)
Dept: FAMILY MEDICINE CLINIC | Age: 60
End: 2020-10-09

## 2020-10-09 NOTE — TELEPHONE ENCOUNTER
----- Message from Sean Cherry sent at 10/9/2020  2:53 PM EDT -----  Regarding: Joe Soliz MD  Patient return call    Caller's first and last name and relationship (if not the patient): Dariusz Ellison Mother       Best contact number(s): [phone]      Whose call is being returned: Heidy Vázquez      Details to clarify the request:       Sean Cherry

## 2020-10-12 ENCOUNTER — OFFICE VISIT (OUTPATIENT)
Dept: FAMILY MEDICINE CLINIC | Age: 60
End: 2020-10-12
Payer: COMMERCIAL

## 2020-10-12 ENCOUNTER — TRANSCRIBE ORDER (OUTPATIENT)
Dept: FAMILY MEDICINE CLINIC | Age: 60
End: 2020-10-12

## 2020-10-12 VITALS
DIASTOLIC BLOOD PRESSURE: 70 MMHG | SYSTOLIC BLOOD PRESSURE: 92 MMHG | RESPIRATION RATE: 16 BRPM | OXYGEN SATURATION: 97 % | WEIGHT: 196 LBS | HEART RATE: 102 BPM | BODY MASS INDEX: 25.15 KG/M2 | TEMPERATURE: 98.3 F | HEIGHT: 74 IN

## 2020-10-12 DIAGNOSIS — Z23 NEEDS FLU SHOT: ICD-10-CM

## 2020-10-12 DIAGNOSIS — B35.1 ONYCHOMYCOSIS OF TOENAIL: Primary | ICD-10-CM

## 2020-10-12 DIAGNOSIS — L03.119 CELLULITIS OF FOOT: ICD-10-CM

## 2020-10-12 DIAGNOSIS — E11.51 TYPE 2 DIABETES MELLITUS WITH PERIPHERAL VASCULAR DISEASE (HCC): ICD-10-CM

## 2020-10-12 DIAGNOSIS — E11.42 DIABETIC POLYNEUROPATHY ASSOCIATED WITH TYPE 2 DIABETES MELLITUS (HCC): ICD-10-CM

## 2020-10-12 PROCEDURE — 99214 OFFICE O/P EST MOD 30 MIN: CPT | Performed by: FAMILY MEDICINE

## 2020-10-12 PROCEDURE — 90471 IMMUNIZATION ADMIN: CPT | Performed by: FAMILY MEDICINE

## 2020-10-12 PROCEDURE — 90686 IIV4 VACC NO PRSV 0.5 ML IM: CPT | Performed by: FAMILY MEDICINE

## 2020-10-12 RX ORDER — CEPHALEXIN 500 MG/1
500 CAPSULE ORAL 4 TIMES DAILY
Qty: 40 CAP | Refills: 0 | Status: SHIPPED | OUTPATIENT
Start: 2020-10-12 | End: 2020-10-22

## 2020-10-12 RX ORDER — TERBINAFINE HYDROCHLORIDE 250 MG/1
250 TABLET ORAL DAILY
Qty: 90 TAB | Refills: 0 | Status: SHIPPED | OUTPATIENT
Start: 2020-10-12 | End: 2021-05-03

## 2020-10-12 RX ORDER — CIPROFLOXACIN 500 MG/1
TABLET ORAL
COMMUNITY
Start: 2020-10-09 | End: 2021-05-03

## 2020-10-12 NOTE — TELEPHONE ENCOUNTER
Spoke with patient wife, verified id [id number]. Gave pt verbal message, per Dr. Eliza Prieto.  Wife stated that he has apt at 11:20 am.

## 2020-10-12 NOTE — PROGRESS NOTES
Identified pt with two pt identifiers(name and ). Reviewed record in preparation for visit and have obtained necessary documentation. Chief Complaint   Patient presents with    Foot Injury        Health Maintenance Due   Topic    Pneumococcal 0-64 years (1 of 1 - PPSV23)    Foot Exam Q1     MICROALBUMIN Q1     Eye Exam Retinal or Dilated     Colonoscopy     DTaP/Tdap/Td series (1 - Tdap)    Shingrix Vaccine Age 50> (1 of 2)    Flu Vaccine (1)       Visit Vitals  Blood Pressure 92/70 (BP 1 Location: Left arm, BP Patient Position: Sitting)   Pulse (Abnormal) 102   Temperature 98.3 °F (36.8 °C) (Oral)   Respiration 16   Height 6' 2\" (1.88 m)   Weight 196 lb (88.9 kg)   Oxygen Saturation 97%   Body Mass Index 25.16 kg/m²         Coordination of Care Questionnaire:  :   1) Have you been to an emergency room, urgent care, or hospitalized since your last visit? NO      2. Have seen or consulted any other health care provider since your last visit? NO          Patient is accompanied by  I have received verbal consent from Sabina So to discuss any/all medical information while they are present in the room.

## 2020-10-12 NOTE — PROGRESS NOTES
Georges Freeman is a 63 y.o. male who presents today with the following:    HPI  Chief Complaint   Patient presents with    Foot Injury    Immunization/Injection     FLU SHOT       1. Onychomycosis of toenail  Patient recently noticed that his toenails are ingrown along. He requests podiatry referral.  He has erythema in between all toes of left foot. Has fungal disease of all 10 toes. 2. Diabetic polyneuropathy associated with type 2 diabetes mellitus ([facility]  Has history of toe amputation. Has peripheral neuropathy. Requests podiatry referral.    3. Type 2 diabetes mellitus with peripheral vascular disease ([facility]    Not compliant with Lantus. Reports only taking 1 to 4 units Lantus at night. Patient presents today for diabetes follow up. Pt. current diabetic medications include metformin, glipizide, Lantus. Taking medication as prescribed. Current monitoring regimen: home blood tests -1 times daily. Home blood sugar records: fasting range: 160s. Any episodes of hypoglycemia? no. Known diabetic complications: peripheral neuropathy. Cardiovascular risk factors: dyslipidemia, diabetes mellitus. currently on ASA, statin, ACE/ARB. Diabetic Foot and Eye Exam HM Status   Topic Date Due    Diabetic Foot Care  07/24/1970    Eye Exam  07/24/1970     There is no immunization history for the selected administration types on file for this patient. No results found for: MCACR, MCA1, MCA2, MCA3, MCAU, MCAU2, MCALPOCT  Lab Results   Component Value Date/Time    Hemoglobin A1c 9.4 (H) 09/01/2020 12:34 PM           4. Cellulitis of foot  Has erythema between toes of left foot. Patient does not know when this started. Denies fever chills. Has not tried any OTC medications. Has history of right great toe amputation    5. Needs flu shot  Requests flu shot         Review of Systems   Constitutional: Negative. HENT: Negative. Eyes: Negative. Respiratory: Negative. Cardiovascular: Negative. Gastrointestinal: Negative. Genitourinary: Negative. Urinary retention, catheter in place   Musculoskeletal: Negative. Skin: Negative. Neurological: Positive for weakness. Negative for dizziness. Status post stroke   Endo/Heme/Allergies: Negative. Psychiatric/Behavioral: Negative. Physical Exam  Vitals signs and nursing note reviewed. HENT:      Head: Normocephalic and atraumatic. Right Ear: External ear normal.      Left Ear: External ear normal.      Nose: Nose normal.   Eyes:      Conjunctiva/sclera: Conjunctivae normal.   Neck:      Musculoskeletal: Normal range of motion and neck supple. Thyroid: No thyromegaly. Cardiovascular:      Rate and Rhythm: Tachycardia present. Pulmonary:      Effort: Pulmonary effort is normal. No respiratory distress. Musculoskeletal: Normal range of motion. Right lower leg: Edema present. Left lower leg: Edema present. Feet:      Right foot:      Amputation: (Right first great toe)     Protective Sensation: 10 sites tested. 2 sites sensed. Skin integrity: Erythema present. Toenail Condition: Right toenails are abnormally thick, long and ingrown. Fungal disease present. Left foot:      Protective Sensation: 10 sites tested. 2 sites sensed. Skin integrity: Erythema present. Toenail Condition: Left toenails are abnormally thick, long and ingrown. Fungal disease present. Skin:     General: Skin is warm and dry. Neurological:      Mental Status: He is alert and oriented to person, place, and time.    Psychiatric:         Mood and Affect: Mood and affect normal.       BP 92/70 (BP 1 Location: Left arm, BP Patient Position: Sitting)   Pulse (!) 102   Temp 98.3 °F (36.8 °C) (Oral)   Resp 16   Ht 6' 2\" (1.88 m)   Wt 196 lb (88.9 kg)   SpO2 97%   BMI 25.16 kg/m²     Allergies   Allergen Reactions    Latex Swelling    Banana Swelling       Current Outpatient Medications   Medication Sig    ciprofloxacin HCl (CIPRO) 500 mg tablet     terbinafine HCL (LAMISIL) 250 mg tablet Take 1 Tab by mouth daily. Indications: fungal infection of toenail    cephALEXin (KEFLEX) 500 mg capsule Take 1 Cap by mouth four (4) times daily for 10 days.  Novofine Autocover 30 gauge x 1/3\" ndle TAKE INSULIN AS PRESCRIBED    Insulin Needles, Disposable, 30 gauge x 1/3\" Take insulin as prescribed    insulin glargine (LANTUS,BASAGLAR) 100 unit/mL (3 mL) inpn 5 units sq daily with dinner  Indications: type 2 diabetes mellitus    clopidogreL (PLAVIX) 75 mg tab Take 1 Tab by mouth daily.  aspirin (ASPIRIN) 325 mg tablet Take 1 Tab by mouth daily.  metFORMIN (GLUCOPHAGE) 850 mg tablet TAKE 1 TABLET BY MOUTH TWICE A DAY WITH MEALS    senna (SENOKOT) 8.6 mg tablet Take 1 Tab by mouth daily. Indications: NOT TAKING    tamsulosin (FLOMAX) 0.4 mg capsule TAKE 1 CAPSULE BY MOUTH AFTER BREAKFAST    midodrine (PROAMATINE) 5 mg tablet Take 1 Tab by mouth three (3) times daily (with meals).  glipiZIDE (GLUCOTROL) 5 mg tablet Take 1 Tab by mouth two (2) times a day. No current facility-administered medications for this visit.         Past Medical History:   Diagnosis Date    Arterial ischemic stroke, MCA, right, remote, resolved 9/2/2020    Diabetes ([facility]     H/O carotid angioplasty 9/2/2020    RIGHT    History of common carotid artery stent placement 9/2/2020    RIGHT    Hypotension     Stroke [facility])        Past Surgical History:   Procedure Laterality Date    VASCULAR SURGERY PROCEDURE UNLIST  08/2020    Carotid Dopler       Problem List  Date Reviewed: 10/12/2020          Codes Class Noted    Type 2 diabetes mellitus with peripheral vascular disease ([facility] ICD-10-CM: E11.51  ICD-9-CM: 250.70, 443.81  9/29/2020        History of ankle surgery ICD-10-CM: Z98.890  ICD-9-CM: V45.89  9/2/2020        Peripheral arterial occlusive disease ([facility] ICD-10-CM: I77.9  ICD-9-CM: 444.22  9/2/2020        Diabetes mellitus ([facility] ICD-10-CM: E11.9  ICD-9-CM: 250.00  9/2/2020        H/O carotid angioplasty ICD-10-CM: Z98.62  ICD-9-CM: V45.89  9/2/2020    Overview Signed 9/2/2020 10:55 AM by Hemant Gutierrez MD     RIGHT             History of common carotid artery stent placement ICD-10-CM: Z98.890, U14.271  ICD-9-CM: V45.89  9/2/2020    Overview Signed 9/2/2020 10:56 AM by Hemant Gutierrez MD     RIGHT             Arterial ischemic stroke, MCA, right, remote, resolved ICD-10-CM: Z86.73  ICD-9-CM: V12.54  9/2/2020        Brain injury ([facility] ICD-10-CM: S06. 9X9A  ICD-9-CM: 854.00  9/2/2020               No results found for this visit on 10/12/20.      1. Onychomycosis of toenail    - terbinafine HCL (LAMISIL) 250 mg tablet; Take 1 Tab by mouth daily. Indications: fungal infection of toenail  Dispense: 90 Tab; Refill: 0  - REFERRAL TO PODIATRY    2. Diabetic polyneuropathy associated with type 2 diabetes mellitus (HCC)    - REFERRAL TO PODIATRY    3. Type 2 diabetes mellitus with peripheral vascular disease ([facility]  Advised patient to take Lantus 5 units daily as prescribed with dinner. Keep blood sugar log. I will adjust insulin in 2 weeks. - REFERRAL TO PODIATRY  -  DIABETES FOOT EXAM    4. Cellulitis of foot    - cephALEXin (KEFLEX) 500 mg capsule; Take 1 Cap by mouth four (4) times daily for 10 days. Dispense: 40 Cap; Refill: 0    5. Needs flu shot    - INFLUENZA VIRUS VACCINE, QUADRIVALENT (RIV4), DERIVED FROM RECOMBINANT DNA,HEMAGGLUTININ(HA) PROTEIN ONLY, PF ABX FREE    Short-term disability paperwork filled out. Follow-up and Dispositions    · Return in about 2 weeks (around 10/26/2020) for follow up. I ADVISED PATIENT TO GO TO ER IF SYMPTOMS WORSEN , CHANGE OR FAILS TO IMPROVE. I have discussed the diagnosis with the patient and the intended plan as seen in the above orders. The patient has received an after-visit summary and questions were answered concerning future plans.   I have discussed medication side effects and warnings with the patient as well. The patient agrees and understands above plan.            Aline Reinoso MD

## 2020-10-13 ENCOUNTER — TELEPHONE (OUTPATIENT)
Dept: FAMILY MEDICINE CLINIC | Age: 60
End: 2020-10-13

## 2020-10-13 NOTE — TELEPHONE ENCOUNTER
Please fax short-term disability form filled out and placed in my out box. Please attach all my visit notes, lab results and copy of referrals with the form.   Thanks

## 2020-10-14 ENCOUNTER — TELEPHONE (OUTPATIENT)
Dept: FAMILY MEDICINE CLINIC | Age: 60
End: 2020-10-14

## 2020-10-14 NOTE — TELEPHONE ENCOUNTER
Left message on home number:    Please call back and inform patient, quantity on prescriptions is correct. Reflexes for 10 days and Lamisil is for 12 weeks.   Thanks

## 2020-10-14 NOTE — TELEPHONE ENCOUNTER
Please call back and inform patient, quantity on prescriptions is correct. Reflexes for 10 days and Lamisil is for 12 weeks.   Thanks

## 2020-10-14 NOTE — TELEPHONE ENCOUNTER
Patient has questions about medications sent to the pharmacy. - terbinafine HCL (LAMISIL) 250 mg tablet; Take 1 Tab by mouth daily. Indications: fungal infection of toenail  Dispense: 90 Tab; Refill: 0    cephALEXin (KEFLEX) 500 mg capsule; Take 1 Cap by mouth four (4) times daily for 10 days. Dispense: 40 Cap; Refill: 0    Patient would like to verify if  the quantity of tables/capsules for these prescriptions are correct. Advised blayne dispense quantity for cephALEXin was correct.     LOV:10/12/20

## 2020-10-20 ENCOUNTER — TELEPHONE (OUTPATIENT)
Dept: FAMILY MEDICINE CLINIC | Age: 60
End: 2020-10-20

## 2020-10-20 NOTE — TELEPHONE ENCOUNTER
----- Message from [employer] sent at 10/12/2020  2:43 PM EDT -----  Regarding: Dr. Hank Fierro Message/Vendor Calls    Caller's first and last name: Wife of the Pt       Reason for call:Pt is inquiring about the number for the cardiologist that he suppose to be scheduling and appointment       Callback required yes/no and why:Y       Best contact number(s):[phone] is cell for Pt       Details to clarify the request:      Muna Gaxiola

## 2020-10-20 NOTE — TELEPHONE ENCOUNTER
Please call back & Please give referral info to patient, cardiology referral placed in chart 9/15/2020. Patient to make appointment.

## 2020-10-27 ENCOUNTER — OFFICE VISIT (OUTPATIENT)
Dept: FAMILY MEDICINE CLINIC | Age: 60
End: 2020-10-27
Payer: COMMERCIAL

## 2020-10-27 VITALS
DIASTOLIC BLOOD PRESSURE: 63 MMHG | HEART RATE: 104 BPM | OXYGEN SATURATION: 97 % | TEMPERATURE: 97.3 F | WEIGHT: 194 LBS | SYSTOLIC BLOOD PRESSURE: 86 MMHG | BODY MASS INDEX: 24.9 KG/M2 | HEIGHT: 74 IN | RESPIRATION RATE: 20 BRPM

## 2020-10-27 DIAGNOSIS — R29.898 LEFT ARM WEAKNESS: ICD-10-CM

## 2020-10-27 DIAGNOSIS — E11.8 CONTROLLED TYPE 2 DIABETES MELLITUS WITH COMPLICATION, WITH LONG-TERM CURRENT USE OF INSULIN (HCC): ICD-10-CM

## 2020-10-27 DIAGNOSIS — L03.119 CELLULITIS OF FOOT: ICD-10-CM

## 2020-10-27 DIAGNOSIS — Z79.4 CONTROLLED TYPE 2 DIABETES MELLITUS WITH COMPLICATION, WITH LONG-TERM CURRENT USE OF INSULIN (HCC): ICD-10-CM

## 2020-10-27 DIAGNOSIS — I63.9 CEREBROVASCULAR ACCIDENT (CVA), UNSPECIFIED MECHANISM (HCC): Primary | ICD-10-CM

## 2020-10-27 PROCEDURE — 99214 OFFICE O/P EST MOD 30 MIN: CPT | Performed by: FAMILY MEDICINE

## 2020-10-27 RX ORDER — INSULIN GLARGINE 100 [IU]/ML
5 INJECTION, SOLUTION SUBCUTANEOUS
Qty: 3 ADJUSTABLE DOSE PRE-FILLED PEN SYRINGE | Refills: 5 | Status: SHIPPED | OUTPATIENT
Start: 2020-10-27 | End: 2021-01-13

## 2020-10-27 RX ORDER — ACETAMINOPHEN 325 MG/1
650 TABLET ORAL AS NEEDED
COMMUNITY
Start: 2020-08-13

## 2020-10-27 RX ORDER — LISINOPRIL 10 MG/1
TABLET ORAL
COMMUNITY
Start: 2020-08-13 | End: 2020-10-27

## 2020-10-27 NOTE — PROGRESS NOTES
Katarzyna Ramos is a 63 y.o. male who presents today with the following:    HPI  Chief Complaint   Patient presents with    Follow-up     2 week follow up       1. Controlled type 2 diabetes mellitus with complication, with long-term current use of insulin ([facility]  Diabetes  Patient presents today for diabetes follow up. Pt. current diabetic medications include Metformin, glipizide, insulin Lantus 5 units at dinner. Taking medication as prescribed. Current monitoring regimen: home blood tests - daily. Home blood sugar records: fasting range: 150 -180s. Any episodes of hypoglycemia? no.     Diabetic Foot and Eye Exam HM Status   Topic Date Due    Eye Exam  07/24/1970    Diabetic Foot Care  10/12/2021     There is no immunization history for the selected administration types on file for this patient. No results found for: MCACR, MCA1, MCA2, MCA3, MCAU, MCAU2, MCALPOCT  Lab Results   Component Value Date/Time    Hemoglobin A1c 9.4 (H) 09/01/2020 12:34 PM           2. Cerebrovascular accident (CVA), unspecified mechanism ([facility]  Left arm weakness. Managed by [facility] neurology. Taking Plavix and aspirin. Stable and baseline. 3. Left arm weakness  Patient has residual left arm and hand weakness status post CVA. He had appointment with [facility] neurology yesterday and they recommended occupational and physical therapy for left arm and left hand. Patient is requesting a referral today. 4. Cellulitis of foot  He is currently on Keflex. Reports improvement of erythema. Taking Lamisil for onychomycosis. Review of Systems   Constitutional: Negative. HENT: Negative. Eyes: Negative. Respiratory: Negative. Cardiovascular: Negative. Gastrointestinal: Negative. Genitourinary: Negative. Urinary retention, catheter in place   Musculoskeletal: Negative. Skin: Negative. Neurological: Positive for weakness. Negative for dizziness. Status post stroke   Endo/Heme/Allergies: Negative. Psychiatric/Behavioral: Negative. Physical Exam  Vitals signs and nursing note reviewed. HENT:      Head: Normocephalic and atraumatic. Right Ear: External ear normal.      Left Ear: External ear normal.      Nose: Nose normal.   Eyes:      Conjunctiva/sclera: Conjunctivae normal.   Neck:      Musculoskeletal: Normal range of motion and neck supple. Thyroid: No thyromegaly. Cardiovascular:      Rate and Rhythm: Tachycardia present. Pulmonary:      Effort: Pulmonary effort is normal. No respiratory distress. Musculoskeletal: Normal range of motion. Right lower leg: Edema present. Left lower leg: Edema present. Feet:      Right foot:      Amputation: (Right first great toe)     Skin integrity: Erythema present. Toenail Condition: Right toenails are abnormally thick, long and ingrown. Fungal disease present. Left foot:      Skin integrity: Erythema present. Toenail Condition: Left toenails are abnormally thick, long and ingrown. Fungal disease present. Comments: Erythema has improved since last time checked, still has mild erythema around and in between the toes of the left foot  Skin:     General: Skin is warm and dry. Neurological:      Mental Status: He is alert and oriented to person, place, and time. Psychiatric:         Mood and Affect: Mood and affect normal.       BP (!) 86/63   Pulse (!) 104   Temp 97.3 °F (36.3 °C) (Oral)   Resp 20   Ht 6' 2\" (1.88 m)   Wt 194 lb (88 kg)   SpO2 97%   BMI 24.91 kg/m²     Allergies   Allergen Reactions    Latex Swelling    Banana Swelling       Current Outpatient Medications   Medication Sig    acetaminophen (TYLENOL) 325 mg tablet 650 mg = 2 tab each dose, PO/NG, every 4 hours, PRN: Pain-MILD(pain scale)/fever>101.5F/38.6C, # 50 tab, 0 Refills, OTC Med    insulin glargine (LANTUS,BASAGLAR) 100 unit/mL (3 mL) inpn 5 Units by SubCUTAneous route two (2) times daily (after meals).  5 units sq daily with dinner Indications: type 2 diabetes mellitus    ciprofloxacin HCl (CIPRO) 500 mg tablet     terbinafine HCL (LAMISIL) 250 mg tablet Take 1 Tab by mouth daily. Indications: fungal infection of toenail    Novofine Autocover 30 gauge x 1/3\" ndle TAKE INSULIN AS PRESCRIBED    midodrine (PROAMATINE) 5 mg tablet Take 1 Tab by mouth three (3) times daily (with meals).  clopidogreL (PLAVIX) 75 mg tab Take 1 Tab by mouth daily.  aspirin (ASPIRIN) 325 mg tablet Take 1 Tab by mouth daily.  glipiZIDE (GLUCOTROL) 5 mg tablet Take 1 Tab by mouth two (2) times a day.  metFORMIN (GLUCOPHAGE) 850 mg tablet TAKE 1 TABLET BY MOUTH TWICE A DAY WITH MEALS    senna (SENOKOT) 8.6 mg tablet Take 1 Tab by mouth daily. Indications: NOT TAKING    tamsulosin (FLOMAX) 0.4 mg capsule TAKE 1 CAPSULE BY MOUTH AFTER BREAKFAST    Insulin Needles, Disposable, 30 gauge x 1/3\" Take insulin as prescribed     No current facility-administered medications for this visit.         Past Medical History:   Diagnosis Date    Arterial ischemic stroke, MCA, right, remote, resolved 9/2/2020    [facility]     H/O carotid angioplasty 9/2/2020    RIGHT    History of common carotid artery stent placement 9/2/2020    RIGHT    Hypotension     Stroke [facility])        Past Surgical History:   Procedure Laterality Date    VASCULAR SURGERY PROCEDURE UNLIST  08/2020    Carotid Dopler       Problem List  Date Reviewed: 10/12/2020          Codes Class Noted    Type 2 diabetes mellitus with peripheral vascular disease ([facility] ICD-10-CM: E11.51  ICD-9-CM: 250.70, 443.81  9/29/2020        History of ankle surgery ICD-10-CM: Z98.890  ICD-9-CM: V45.89  9/2/2020        Peripheral arterial occlusive disease ([facility] ICD-10-CM: I77.9  ICD-9-CM: 444.22  9/2/2020        Diabetes mellitus ([facility] ICD-10-CM: E11.9  ICD-9-CM: 250.00  9/2/2020        H/O carotid angioplasty ICD-10-CM: U66.76  ICD-9-CM: V45.89  9/2/2020    Overview Signed 9/2/2020 10:55 AM by Marjorie Cevallos MD RIGHT             History of common carotid artery stent placement ICD-10-CM: Z98.890, X74.327  ICD-9-CM: V45.89  9/2/2020    Overview Signed 9/2/2020 10:56 AM by Francine Grier MD     RIGHT             Arterial ischemic stroke, MCA, right, remote, resolved ICD-10-CM: Z86.73  ICD-9-CM: V12.54  9/2/2020        Brain injury [facility]) ICD-10-CM: S06. 9X9A  ICD-9-CM: 854.00  9/2/2020               No results found for this visit on 10/27/20. 1. Controlled type 2 diabetes mellitus with complication, with long-term current use of insulin ([facility])  Increase Lantus to 5 units twice a day. Advised patient to make appointment with diabetes education    - insulin glargine (LANTUS,BASAGLAR) 100 unit/mL (3 mL) inpn; 5 Units by SubCUTAneous route two (2) times daily (after meals). 5 units sq daily with dinner  Indications: type 2 diabetes mellitus  Dispense: 3 Adjustable Dose Pre-filled Pen Syringe; Refill: 5    2. Cerebrovascular accident (CVA), unspecified mechanism ([facility]    - REFERRAL TO PHYSICAL THERAPY  - REFERRAL TO OCCUPATIONAL THERAPY    3. Left arm weakness    - REFERRAL TO PHYSICAL THERAPY  - REFERRAL TO OCCUPATIONAL THERAPY    4. Cellulitis of foot  Improving. Continue Keflex. Patient to follow-up if not completely resolved. Continue Lamisil. Blood pressure low today. Patient taking midodrine 3 times a day. Has appointment with cardiology. Follow-up and Dispositions    · Return in about 2 weeks (around 11/10/2020) for DIABETES. I ADVISED PATIENT TO GO TO ER IF SYMPTOMS WORSEN , CHANGE OR FAILS TO IMPROVE. I have discussed the diagnosis with the patient and the intended plan as seen in the above orders. The patient has received an after-visit summary and questions were answered concerning future plans. I have discussed medication side effects and warnings with the patient as well. The patient agrees and understands above plan.            Garrison Teague MD

## 2020-10-27 NOTE — PROGRESS NOTES
Patient stated name &     Chief Complaint   Patient presents with    Follow-up     2 week follow up        Health Maintenance Due   Topic    Pneumococcal 0-64 years (1 of 1 - PPSV23)    MICROALBUMIN Q1     Eye Exam Retinal or Dilated     DTaP/Tdap/Td series (1 - Tdap)    Shingrix Vaccine Age 49> (1 of 2)    Colorectal Cancer Screening Combo        Wt Readings from Last 3 Encounters:   10/27/20 194 lb (88 kg)   10/12/20 196 lb (88.9 kg)   20 197 lb (89.4 kg)     Temp Readings from Last 3 Encounters:   10/27/20 97.3 °F (36.3 °C) (Oral)   10/12/20 98.3 °F (36.8 °C) (Oral)   20 97.5 °F (36.4 °C) (Oral)     BP Readings from Last 3 Encounters:   10/27/20 (!) 86/63   10/12/20 92/70   20 103/72     Pulse Readings from Last 3 Encounters:   10/27/20 (!) 104   10/12/20 (!) 102   20 85         Learning Assessment:  :     No flowsheet data found. Depression Screening:  :     3 most recent PHQ Screens 10/27/2020   Little interest or pleasure in doing things Not at all   Feeling down, depressed, irritable, or hopeless Not at all   Total Score PHQ 2 0       Fall Risk Assessment:  :     No flowsheet data found. Abuse Screening:  :     No flowsheet data found. Coordination of Care Questionnaire:  :     1) Have you been to an emergency room, urgent care clinic since your last visit? No    Hospitalized since your last visit? No             2) Have you seen or consulted any other health care providers outside of [address] [place] [address] since your last visit? No  (Include any pap smears or colon screenings in this section.)    Patient is accompanied by Lazaro Chowdhury  I have received verbal consent from Timoteo Doan to discuss any/all medical information while they are present in the room.

## 2020-11-09 ENCOUNTER — OFFICE VISIT (OUTPATIENT)
Dept: CARDIOLOGY CLINIC | Age: 60
End: 2020-11-09
Payer: COMMERCIAL

## 2020-11-09 VITALS
SYSTOLIC BLOOD PRESSURE: 96 MMHG | OXYGEN SATURATION: 98 % | WEIGHT: 192 LBS | BODY MASS INDEX: 24.64 KG/M2 | HEART RATE: 104 BPM | DIASTOLIC BLOOD PRESSURE: 66 MMHG | HEIGHT: 74 IN

## 2020-11-09 DIAGNOSIS — I95.9 HYPOTENSION, UNSPECIFIED HYPOTENSION TYPE: Primary | ICD-10-CM

## 2020-11-09 DIAGNOSIS — I63.9 CEREBROVASCULAR ACCIDENT (CVA), UNSPECIFIED MECHANISM (HCC): ICD-10-CM

## 2020-11-09 DIAGNOSIS — I10 ESSENTIAL HYPERTENSION: ICD-10-CM

## 2020-11-09 PROCEDURE — 93000 ELECTROCARDIOGRAM COMPLETE: CPT | Performed by: SPECIALIST

## 2020-11-09 PROCEDURE — 99245 OFF/OP CONSLTJ NEW/EST HI 55: CPT | Performed by: SPECIALIST
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
## 2020-11-09 NOTE — PROGRESS NOTES
Mariaa Hdz is a 63 y.o. male    Visit Vitals  BP 96/66 (BP 1 Location: Left arm, BP Patient Position: Sitting)   Pulse (!) 104   Ht 6' 2\" (1.88 m)   Wt 192 lb (87.1 kg)   SpO2 98%   BMI 24.65 kg/m²       Chief Complaint   Patient presents with    Other     EDEMA    Other     CVA       Chest pain NO  SOB NO  Dizziness NO  Swelling ANKLES AND FEET  Recent hospital visit NO  Refills NO

## 2020-12-02 ENCOUNTER — TELEPHONE (OUTPATIENT)
Dept: FAMILY MEDICINE CLINIC | Age: 60
End: 2020-12-02

## 2020-12-02 NOTE — TELEPHONE ENCOUNTER
Please call inform patient, we have received disability paperwork from [facility]. This is for disability beginning on 11/1/2020. Patient needs appointment to fill out paperwork.   Thanks

## 2020-12-07 ENCOUNTER — OFFICE VISIT (OUTPATIENT)
Dept: FAMILY MEDICINE CLINIC | Age: 60
End: 2020-12-07
Payer: COMMERCIAL

## 2020-12-07 ENCOUNTER — TELEPHONE (OUTPATIENT)
Dept: FAMILY MEDICINE CLINIC | Age: 60
End: 2020-12-07

## 2020-12-07 VITALS
BODY MASS INDEX: 22.59 KG/M2 | SYSTOLIC BLOOD PRESSURE: 95 MMHG | OXYGEN SATURATION: 96 % | HEIGHT: 74 IN | DIASTOLIC BLOOD PRESSURE: 62 MMHG | WEIGHT: 176 LBS | TEMPERATURE: 97.7 F | RESPIRATION RATE: 16 BRPM | HEART RATE: 126 BPM

## 2020-12-07 DIAGNOSIS — Z79.4 CONTROLLED TYPE 2 DIABETES MELLITUS WITH COMPLICATION, WITH LONG-TERM CURRENT USE OF INSULIN (HCC): ICD-10-CM

## 2020-12-07 DIAGNOSIS — Z12.11 SCREEN FOR COLON CANCER: Primary | ICD-10-CM

## 2020-12-07 DIAGNOSIS — E11.8 CONTROLLED TYPE 2 DIABETES MELLITUS WITH COMPLICATION, WITH LONG-TERM CURRENT USE OF INSULIN (HCC): ICD-10-CM

## 2020-12-07 LAB
EST. AVERAGE GLUCOSE BLD GHB EST-MCNC: 117 MG/DL
HBA1C MFR BLD: 5.7 % (ref 4–5.6)

## 2020-12-07 PROCEDURE — 99213 OFFICE O/P EST LOW 20 MIN: CPT | Performed by: FAMILY MEDICINE

## 2020-12-07 RX ORDER — FLASH GLUCOSE SENSOR
KIT MISCELLANEOUS
Qty: 1 KIT | Refills: 0 | Status: SHIPPED | OUTPATIENT
Start: 2020-12-07 | End: 2020-12-16

## 2020-12-07 RX ORDER — FLASH GLUCOSE SCANNING READER
EACH MISCELLANEOUS
Qty: 30 EACH | Refills: 5 | Status: SHIPPED | OUTPATIENT
Start: 2020-12-07 | End: 2020-12-14 | Stop reason: SDUPTHER

## 2020-12-07 RX ORDER — INSULIN GLARGINE 100 [IU]/ML
8 INJECTION, SOLUTION SUBCUTANEOUS DAILY
COMMUNITY
Start: 2020-10-26 | End: 2021-05-14

## 2020-12-07 NOTE — PROGRESS NOTES
Patient stated name &     Chief Complaint   Patient presents with    Other     Cardiology follow up   &  a referral to [address] [place] [address] Maintenance Due   Topic    Pneumococcal 0-64 years (1 of 1 - PPSV23)    MICROALBUMIN Q1     Eye Exam Retinal or Dilated     DTaP/Tdap/Td series (1 - Tdap)    Shingrix Vaccine Age 49> (1 of 2)    Colorectal Cancer Screening Combo     A1C test (Diabetic or Prediabetic)        Wt Readings from Last 3 Encounters:   20 176 lb (79.8 kg)   20 192 lb (87.1 kg)   10/27/20 194 lb (88 kg)     Temp Readings from Last 3 Encounters:   20 97.7 °F (36.5 °C) (Oral)   10/27/20 97.3 °F (36.3 °C) (Oral)   10/12/20 98.3 °F (36.8 °C) (Oral)     BP Readings from Last 3 Encounters:   20 95/62   20 96/66   10/27/20 (!) 86/63     Pulse Readings from Last 3 Encounters:   20 (!) 126   20 (!) 104   10/27/20 (!) 104         Learning Assessment:  :     No flowsheet data found. Depression Screening:  :     3 most recent PHQ Screens 2020   Little interest or pleasure in doing things Not at all   Feeling down, depressed, irritable, or hopeless Not at all   Total Score PHQ 2 0       Fall Risk Assessment:  :     No flowsheet data found. Abuse Screening:  :     No flowsheet data found. Coordination of Care Questionnaire:  :     1) Have you been to an emergency room, urgent care clinic since your last visit? No    Hospitalized since your last visit? No             2) Have you seen or consulted any other health care providers outside of [address] [place] [address] since your last visit? No  (Include any pap smears or colon screenings in this section.)    Patient is accompanied by Miguelangel Barnard I have received verbal consent from Mallory Worthington to discuss any/all medical information while they are present in the room. Reviewed record in preparation for visit and have obtained necessary documentation.   Medication reconciliation up to date and corrected with patient at this time.

## 2020-12-07 NOTE — PROGRESS NOTES
Nicolas Schulte is a 63 y.o. male who presents today with the following:    HPI  Chief Complaint   Patient presents with   [address] [place] [address] Other     Cardiology follow up   &  a referral to [address] [place] [address]     A1C        1. Screen for colon cancer  Needs colonoscopy. 2. Controlled type 2 diabetes mellitus with complication, with long-term current use of insulin ([address]  Requests prescription for freestyle everett. Checking blood sugars 4 times a day. Reports fasting blood sugar is 118120s. Currently taking Lantus 5 units twice a day in addition to metformin glipizide. Diabetic Foot and Eye Exam HM Status   Topic Date Due    Eye Exam  07/24/1970    Diabetic Foot Care  10/12/2021     There is no immunization history for the selected administration types on file for this patient. No results found for: MCACR, MCA1, MCA2, MCA3, MCAU, MCAU2, MCALPOCT  Lab Results   Component Value Date/Time    Hemoglobin A1c 9.4 (H) 09/01/2020 12:34 PM       Advised patient to schedule thyroid ultrasound today. Disability paperwork filled out today. Patient has urology appointment in January. Cardiology echo scheduled 12/16/2020. [facility] neurology follow-up in February 2021. Review of Systems   Constitutional: Negative. HENT: Negative. Eyes: Negative. Respiratory: Negative. Cardiovascular: Negative. Gastrointestinal: Negative. Genitourinary: Negative. Urinary retention, catheter in place   Musculoskeletal: Negative. Skin: Negative. Neurological: Positive for weakness. Negative for dizziness. Status post stroke   Endo/Heme/Allergies: Negative. Psychiatric/Behavioral: Negative. Physical Exam  Vitals signs and nursing note reviewed. HENT:      Head: Normocephalic and atraumatic.       Right Ear: External ear normal.      Left Ear: External ear normal.      Nose: Nose normal.   Eyes:      Conjunctiva/sclera: Conjunctivae normal.   Neck:      Musculoskeletal: Normal range of motion and neck supple. Thyroid: No thyromegaly. Cardiovascular:      Rate and Rhythm: Regular rhythm. Tachycardia present. Pulmonary:      Effort: Pulmonary effort is normal.      Breath sounds: Normal breath sounds. Abdominal:      General: Bowel sounds are normal. There is no distension. Palpations: Abdomen is soft. Tenderness: There is no abdominal tenderness. Musculoskeletal: Normal range of motion. Lymphadenopathy:      Cervical: No cervical adenopathy. Skin:     General: Skin is warm and dry. Neurological:      Mental Status: He is alert and oriented to person, place, and time. Psychiatric:         Mood and Affect: Mood and affect normal.       BP 95/62   Pulse (!) 126   Temp 97.7 °F (36.5 °C) (Oral)   Resp 16   Ht 6' 2\" (1.88 m)   Wt 176 lb (79.8 kg)   SpO2 96%   BMI 22.60 kg/m²     Allergies   Allergen Reactions    Latex Swelling    Banana Swelling       Current Outpatient Medications   Medication Sig    insulin glargine (LANTUS) 100 unit/mL injection 5 Units, 0 Refills    flash glucose sensor (FreeStyle Abel 14 Day Sensor) kit Check blood glucose 4 x a day    flash glucose scanning reader (FreeStyle Abel 14 Day Arma) misc Check blood glucose 4 x a day    acetaminophen (TYLENOL) 325 mg tablet 650 mg = 2 tab each dose, PO/NG, every 4 hours, PRN: Pain-MILD(pain scale)/fever>101.5F/38.6C, # 50 tab, 0 Refills, OTC Med    insulin glargine (LANTUS,BASAGLAR) 100 unit/mL (3 mL) inpn 5 Units by SubCUTAneous route two (2) times daily (after meals). 5 units sq daily with dinner  Indications: type 2 diabetes mellitus    ciprofloxacin HCl (CIPRO) 500 mg tablet     terbinafine HCL (LAMISIL) 250 mg tablet Take 1 Tab by mouth daily. Indications: fungal infection of toenail    midodrine (PROAMATINE) 5 mg tablet Take 1 Tab by mouth three (3) times daily (with meals).  clopidogreL (PLAVIX) 75 mg tab Take 1 Tab by mouth daily.  aspirin (ASPIRIN) 325 mg tablet Take 1 Tab by mouth daily.  glipiZIDE (GLUCOTROL) 5 mg tablet Take 1 Tab by mouth two (2) times a day.  metFORMIN (GLUCOPHAGE) 850 mg tablet TAKE 1 TABLET BY MOUTH TWICE A DAY WITH MEALS    senna (SENOKOT) 8.6 mg tablet Take 1 Tab by mouth daily. Indications: NOT TAKING    tamsulosin (FLOMAX) 0.4 mg capsule TAKE 1 CAPSULE BY MOUTH AFTER BREAKFAST    Novofine Autocover 30 gauge x 1/3\" ndle TAKE INSULIN AS PRESCRIBED    Insulin Needles, Disposable, 30 gauge x 1/3\" Take insulin as prescribed     No current facility-administered medications for this visit. Past Medical History:   Diagnosis Date    Arterial ischemic stroke, MCA, right, remote, resolved 08/07/2020    Right MCA CVA 8/7/20 --> stenting of occluded YUDY    [facility]re[facility]     Right MCA CVA 8/7/20 --> stenting of occluded YUDY    Diabetes [facility]     H/O carotid angioplasty     RIGHT ICA stenting 8/7/20    History of common carotid artery stent placement     RIGHT ICA stenting 8/7/20    Hypotension     PAD (peripheral artery disease) ([facility])     right LE vascular intervention in distal past    Stroke [facility]     TBI (traumatic brain injury) ([facility]     TBI in 2008 ([facility]VC)     Urinary retention        Past Surgical History:   Procedure Laterality Date    VASCULAR SURGERY PROCEDURE UNLIST  08/2020    Carotid Dopler       Problem List  Date Reviewed: 11/9/2020          Codes Class Noted    Hypotension ICD-10-CM: I95.9  ICD-9-CM: 458.9  Unknown        PAD (peripheral artery disease) ([facility] ICD-10-CM: I73.9  ICD-9-CM: 443.9  Unknown    Overview Signed 11/9/2020  8:39 PM by Belle Cheng MD     right LE vascular intervention in distal past             TBI (traumatic brain injury) ([facility] ICD-10-CM: S06. 9X9A  ICD-9-CM: 854.00  Unknown    Overview Signed 11/9/2020  8:39 PM by Belle Cheng MD     TBI in 2008 [facility])              CVA (cerebral vascular accident) [facility]) ICD-10-CM: I63.9  ICD-9-CM: 434.91  Unknown    Overview Signed 11/9/2020  8:42 PM by Mony Pink, MD Christian     Right MCA CVA 8/7/20 --> stenting of occluded YUDY             Diabetes ([facility] ICD-10-CM: E11.9  ICD-9-CM: 250.00  Unknown        Stroke ([facility] ICD-10-CM: I63.9  ICD-9-CM: 434.91  Unknown        Type 2 diabetes mellitus with peripheral vascular disease ([facility] ICD-10-CM: E11.51  ICD-9-CM: 250.70, 443.81  9/29/2020        History of ankle surgery ICD-10-CM: Z98.890  ICD-9-CM: V45.89  9/2/2020        Peripheral arterial occlusive disease ([facility] ICD-10-CM: I77.9  ICD-9-CM: 444.22  9/2/2020        Diabetes mellitus ([facility] ICD-10-CM: E11.9  ICD-9-CM: 250.00  9/2/2020        H/O carotid angioplasty ICD-10-CM: W38.58  ICD-9-CM: V45.89  9/2/2020    Overview Signed 9/2/2020 10:55 AM by Deangelo Javed MD     RIGHT             History of common carotid artery stent placement ICD-10-CM: Z98.890, A56.786  ICD-9-CM: V45.89  9/2/2020    Overview Signed 9/2/2020 10:56 AM by Deangelo Javed MD     RIGHT             Arterial ischemic stroke, MCA, right, remote, resolved ICD-10-CM: Z86.73  ICD-9-CM: V12.54  9/2/2020        Brain injury ([facility] ICD-10-CM: S06. 9X9A  ICD-9-CM: 854.00  9/2/2020               No results found for this visit on 12/07/20.      1. Screen for colon cancer    - REFERRAL TO GASTROENTEROLOGY    2. Controlled type 2 diabetes mellitus with complication, with long-term current use of insulin (HCC)    - flash glucose sensor (FreeStyle Abel 14 Day Sensor) kit; Check blood glucose 4 x a day  Dispense: 1 Kit; Refill: 0  - flash glucose scanning reader (FreeStyle Abel 14 Day [place]) misc; Check blood glucose 4 x a day  Dispense: 30 Each; Refill: 5  - HEMOGLOBIN A1C WITH EAG; Future  - HEMOGLOBIN A1C WITH EAG        Follow-up and Dispositions    · Return in about 6 months (around 6/7/2021) for follow up. I ADVISED PATIENT TO GO TO ER IF SYMPTOMS WORSEN , CHANGE OR FAILS TO IMPROVE. I have discussed the diagnosis with the patient and the intended plan as seen in the above orders.   The patient has received an after-visit summary and questions were answered concerning future plans. I have discussed medication side effects and warnings with the patient as well. The patient agrees and understands above plan.            Clementine Jaime MD

## 2020-12-07 NOTE — TELEPHONE ENCOUNTER
----- Message from Leisa Chairez sent at 12/7/2020 10:03 AM EST -----  Regarding: Dr Shu Jefferson first and last name: Najma Gross (Mother)  Reason for call:  Mom would like to inform someone that pt is having cognitive trouble and some memory loss  Callback required yes/no and why: yes, inform someone pt is having trouble with cognitive and memory  Best contact number(s): [phone]  Details to clarify the request: Mom would like to inform someone that pt is having cognitive trouble and some memory loss

## 2020-12-13 ENCOUNTER — TELEPHONE (OUTPATIENT)
Dept: FAMILY MEDICINE CLINIC | Age: 60
End: 2020-12-13

## 2020-12-14 DIAGNOSIS — Z79.4 CONTROLLED TYPE 2 DIABETES MELLITUS WITH COMPLICATION, WITH LONG-TERM CURRENT USE OF INSULIN (HCC): ICD-10-CM

## 2020-12-14 DIAGNOSIS — E11.8 CONTROLLED TYPE 2 DIABETES MELLITUS WITH COMPLICATION, WITH LONG-TERM CURRENT USE OF INSULIN (HCC): ICD-10-CM

## 2020-12-14 RX ORDER — FLASH GLUCOSE SCANNING READER
EACH MISCELLANEOUS
Qty: 30 EACH | Refills: 5 | Status: SHIPPED | OUTPATIENT
Start: 2020-12-14 | End: 2021-08-10

## 2020-12-14 NOTE — TELEPHONE ENCOUNTER
Patient called requesting refills on Set.fm 14 day sensors x 2. Advised that refills would have to wait until open office hours.

## 2020-12-15 DIAGNOSIS — Z79.4 CONTROLLED TYPE 2 DIABETES MELLITUS WITH COMPLICATION, WITH LONG-TERM CURRENT USE OF INSULIN (HCC): ICD-10-CM

## 2020-12-15 DIAGNOSIS — E11.8 CONTROLLED TYPE 2 DIABETES MELLITUS WITH COMPLICATION, WITH LONG-TERM CURRENT USE OF INSULIN (HCC): ICD-10-CM

## 2020-12-16 RX ORDER — FLASH GLUCOSE SENSOR
KIT MISCELLANEOUS
Qty: 1 KIT | Refills: 0 | Status: SHIPPED | OUTPATIENT
Start: 2020-12-16 | End: 2021-01-11 | Stop reason: SDUPTHER

## 2020-12-29 ENCOUNTER — TELEPHONE (OUTPATIENT)
Dept: FAMILY MEDICINE CLINIC | Age: 60
End: 2020-12-29

## 2020-12-29 NOTE — TELEPHONE ENCOUNTER
Spoke with pt mother an informed her that pt does need to have a KIM appt and that once we speak with his PCP we will pull his records at your request.    ----- Message from Harry Guy sent at 12/29/2020 10:49 AM EST -----  Regarding: Dr. Alvarado Jocelyne: [phone]  General Message/Vendor Calls    Caller's first and last name: Blanche Tapia Mother      Reason for call: Pt was at [facility] required yes/no and why: yes      Best contact number(s):[phone]      Details to clarify the request: Pt was admitted to [facility] 12/21/20 and discharged today 12/29/20. Pt was Dx with UTI and he is coming home with a PICC for 2 weeks and I want her office to contact [facility] to get those records to have those added to his chart. Pt's Mother would like for her to take a look at his labs at [facility] and let them know if he needs to come in sooner than June.       Harry Guy

## 2020-12-30 DIAGNOSIS — N40.1 BENIGN PROSTATIC HYPERPLASIA WITH URINARY OBSTRUCTION: ICD-10-CM

## 2020-12-30 DIAGNOSIS — N13.8 BENIGN PROSTATIC HYPERPLASIA WITH URINARY OBSTRUCTION: ICD-10-CM

## 2020-12-30 RX ORDER — TAMSULOSIN HYDROCHLORIDE 0.4 MG/1
CAPSULE ORAL
Qty: 90 CAP | Refills: 1 | Status: SHIPPED | OUTPATIENT
Start: 2020-12-30 | End: 2021-05-03

## 2020-12-30 RX ORDER — CLOPIDOGREL BISULFATE 75 MG/1
75 TABLET ORAL DAILY
Qty: 90 TAB | Refills: 1 | Status: SHIPPED | OUTPATIENT
Start: 2020-12-30 | End: 2021-10-18 | Stop reason: SDUPTHER

## 2021-01-04 ENCOUNTER — TELEPHONE (OUTPATIENT)
Dept: FAMILY MEDICINE CLINIC | Age: 61
End: 2021-01-04

## 2021-01-04 NOTE — TELEPHONE ENCOUNTER
Please advise        ----- Message from Miles Dukes sent at 1/4/2021  1:07 PM EST -----  Regarding: Dr. Mary Ann: [phone]  Medication Refill    Caller (if not patient): Patient      Relationship of caller (if not patient): NA      Best contact number(s):[phone]      Name of medication and dosage if known: \"Freestyle Abel\" sensors only 2x.        Is patient out of this medication (yes/no): yes      Pharmacy name: [facility]    Pharmacy listed in chart? (yes/no):yes  Pharmacy phone number: [phone]      Details to clarify the request:N/A      Miles Dukes

## 2021-01-04 NOTE — TELEPHONE ENCOUNTER
Informed pt mother that he would need to have an appt to receive refills       ----- Message from Ravindra Bledsoe sent at 1/4/2021  7:47 AM EST -----  Regarding: Sammie López  Medication Refill    Caller (if not patient): Angelina Caller       Relationship of caller (if not patient): Mother      Best contact number(s):[phone]      Name of medication and dosage if known: glipizide  5 mg       Is patient out of this medication (yes/no): 2 pills left       Pharmacy name: [facility]     Pharmacy listed in chart? (yes/no): Yes   Pharmacy phone number:      Details to clarify the request: Mom , would like to request a refill of Rx \" Glipizide \" to be sent over to the [facility] pharmacy on file. Pt has two pills left and would like a call back once sent .        Ravindra Bledsoe

## 2021-01-04 NOTE — TELEPHONE ENCOUNTER
Please schedule for hospital discharge follow up, also mother/ patient has to sign release of records for [facility] so we can get records. Thanks.

## 2021-01-05 ENCOUNTER — TELEPHONE (OUTPATIENT)
Dept: FAMILY MEDICINE CLINIC | Age: 61
End: 2021-01-05

## 2021-01-05 DIAGNOSIS — E11.8 CONTROLLED TYPE 2 DIABETES MELLITUS WITH COMPLICATION, WITH LONG-TERM CURRENT USE OF INSULIN (HCC): ICD-10-CM

## 2021-01-05 DIAGNOSIS — Z79.4 CONTROLLED TYPE 2 DIABETES MELLITUS WITH COMPLICATION, WITH LONG-TERM CURRENT USE OF INSULIN (HCC): ICD-10-CM

## 2021-01-05 RX ORDER — GLIPIZIDE 5 MG/1
5 TABLET ORAL 2 TIMES DAILY
Qty: 180 TAB | Refills: 1 | Status: SHIPPED | OUTPATIENT
Start: 2021-01-05 | End: 2021-07-08 | Stop reason: SDUPTHER

## 2021-01-05 RX ORDER — FLASH GLUCOSE SENSOR
KIT MISCELLANEOUS
Qty: 1 KIT | Refills: 5 | Status: SHIPPED | OUTPATIENT
Start: 2021-01-05

## 2021-01-05 NOTE — TELEPHONE ENCOUNTER
Magno Jaime called for Patient  on call service needing refill of glipizide. Called patient back, no answer, left message, patient did not call back.

## 2021-01-07 ENCOUNTER — TELEPHONE (OUTPATIENT)
Dept: FAMILY MEDICINE CLINIC | Age: 61
End: 2021-01-07

## 2021-01-07 NOTE — TELEPHONE ENCOUNTER
Please advise     ----- Message from Loralie Landau sent at 1/7/2021 11:03 AM EST -----  Regarding: Dr. Lydia Suazo: [phone]  General Message/Vendor Calls    Caller's first and last name: Pt.      Reason for call: Rx filled incorrectly. Callback required yes/no and why: Yes, confirmation on change to Rx. Best contact number(s): [phone]      Details to clarify the request: Needs Rx for freestyle everett, 14 day glucose monitor   needs just censors not reader, pt says 2 day was filled instead of 14 day. What was filled is not compatible, is completely out of 14 day. [facility] on [address].         Loralie Landau

## 2021-01-07 NOTE — TELEPHONE ENCOUNTER
Revised message        ----- Message from Jamglue Shows sent at 1/7/2021 11:20 AM EST -----  Regarding: / Telephone  Contact: [phone]  General Message/Vendor Calls    Caller's first and last name: pt      Reason for call:Wrong Rx was called in. Callback required yes/no and why: yes      Best contact number(s): [phone]      Details to clarify the request: Pt had the wrong Rx called in- The 2 day is not compatible. Pt needs a 14 day Free Style [place] Sensor Only. Pt needs that called in today.       [employer]

## 2021-01-08 ENCOUNTER — TELEPHONE (OUTPATIENT)
Dept: FAMILY MEDICINE CLINIC | Age: 61
End: 2021-01-08

## 2021-01-08 NOTE — TELEPHONE ENCOUNTER
I have spoke with his mother and she stated that once his PICC line is removed they would schedule would you be alright with them doing a VV if they are able.    ----- Message from Mayuri Nieves sent at 1/8/2021  8:09 AM EST -----  Regarding: Dr. Jeny Vincent Message/Vendor Calls    Caller's first and last name:Tosha Burns(mother)      Reason for call:form for doctor to complete      Callback required yes/no and why:yes      Best contact number(s): [phone] in law, Quilla Sicard)      Details to clarify the request:Pt's mother stated her son in law will be dropping off a form(Attending physicians' report) and would like the doctor to complete the form, because the pt has been in the hosp and has a pic line, and unable to come to the office. Pt's mother would like a call today or Monday from the doctor.       Mayuri Nieves

## 2021-01-11 DIAGNOSIS — E11.8 CONTROLLED TYPE 2 DIABETES MELLITUS WITH COMPLICATION, WITH LONG-TERM CURRENT USE OF INSULIN (HCC): ICD-10-CM

## 2021-01-11 DIAGNOSIS — Z79.4 CONTROLLED TYPE 2 DIABETES MELLITUS WITH COMPLICATION, WITH LONG-TERM CURRENT USE OF INSULIN (HCC): ICD-10-CM

## 2021-01-11 RX ORDER — FLASH GLUCOSE SENSOR
KIT MISCELLANEOUS
Qty: 1 KIT | Refills: 5 | Status: SHIPPED | OUTPATIENT
Start: 2021-01-11 | End: 2021-01-13 | Stop reason: SDUPTHER

## 2021-01-11 NOTE — TELEPHONE ENCOUNTER
Please schedule VV to fill out forms once patient is discharged from the hospital.  If they need forms filled out while patient is in the hospital then it will have to be filled out by his admitting physician in the hospital.  Thanks

## 2021-01-13 ENCOUNTER — VIRTUAL VISIT (OUTPATIENT)
Dept: FAMILY MEDICINE CLINIC | Age: 61
End: 2021-01-13
Payer: COMMERCIAL

## 2021-01-13 DIAGNOSIS — Z79.4 CONTROLLED TYPE 2 DIABETES MELLITUS WITH COMPLICATION, WITH LONG-TERM CURRENT USE OF INSULIN (HCC): ICD-10-CM

## 2021-01-13 DIAGNOSIS — Z09 HOSPITAL DISCHARGE FOLLOW-UP: Primary | ICD-10-CM

## 2021-01-13 DIAGNOSIS — E11.8 CONTROLLED TYPE 2 DIABETES MELLITUS WITH COMPLICATION, WITH LONG-TERM CURRENT USE OF INSULIN (HCC): ICD-10-CM

## 2021-01-13 PROCEDURE — 99213 OFFICE O/P EST LOW 20 MIN: CPT | Performed by: FAMILY MEDICINE

## 2021-01-13 RX ORDER — POLYETHYLENE GLYCOL 3350 17 G/17G
POWDER, FOR SOLUTION ORAL
COMMUNITY
Start: 2020-12-28 | End: 2021-05-03

## 2021-01-13 RX ORDER — LANOLIN ALCOHOL/MO/W.PET/CERES
CREAM (GRAM) TOPICAL
COMMUNITY
Start: 2020-12-27 | End: 2021-05-03

## 2021-01-13 RX ORDER — FLASH GLUCOSE SENSOR
KIT MISCELLANEOUS
Qty: 1 KIT | Refills: 5 | Status: SHIPPED | OUTPATIENT
Start: 2021-01-13 | End: 2021-03-12 | Stop reason: SDUPTHER

## 2021-01-13 NOTE — PROGRESS NOTES
Patient stated name &   Chief Complaint   Patient presents with    Form Completion     Disability        Health Maintenance Due   Topic    Pneumococcal 0-64 years (1 of 1 - PPSV23)    MICROALBUMIN Q1     Eye Exam Retinal or Dilated     DTaP/Tdap/Td series (1 - Tdap)    Shingrix Vaccine Age 49> (1 of 2)    Colorectal Cancer Screening Combo        Wt Readings from Last 3 Encounters:   20 176 lb (79.8 kg)   20 192 lb (87.1 kg)   10/27/20 194 lb (88 kg)     Temp Readings from Last 3 Encounters:   20 97.7 °F (36.5 °C) (Oral)   10/27/20 97.3 °F (36.3 °C) (Oral)   10/12/20 98.3 °F (36.8 °C) (Oral)     BP Readings from Last 3 Encounters:   20 95/62   20 96/66   10/27/20 (!) 86/63     Pulse Readings from Last 3 Encounters:   20 (!) 126   20 (!) 104   10/27/20 (!) 104         Learning Assessment:  :     No flowsheet data found. Depression Screening:  :     3 most recent PHQ Screens 2020   Little interest or pleasure in doing things Not at all   Feeling down, depressed, irritable, or hopeless Not at all   Total Score PHQ 2 0       Fall Risk Assessment:  :     No flowsheet data found. Abuse Screening:  :     No flowsheet data found. Coordination of Care Questionnaire:  :     1) Have you been to an emergency room, urgent care clinic since your last visit? No    Hospitalized since your last visit? [facility] H/O Dec 22, 2020 UTI             2) Have you seen or consulted any other health care providers outside of [address] [place] [address] since your last visit? Yes See above  (Include any pap smears or colon screenings in this section.)    Patient is accompanied by VV I have received verbal consent from Ernesto Salcedo to discuss any/all medical information while they are present in the room.

## 2021-01-13 NOTE — PROGRESS NOTES
Consent: June Lockett, who was seen by synchronous (real-time) audio-video technology, and/or his healthcare decision maker, is aware that this patient-initiated, Telehealth encounter on 1/13/2021 is a billable service, with coverage as determined by his insurance carrier. He is aware that he may receive a bill and has provided verbal consent to proceed: Yes. Assessment & Plan:   Diagnoses and all orders for this visit:    1. Hospital discharge follow-up    2. Controlled type 2 diabetes mellitus with complication, with long-term current use of insulin ([facility])  -     flash glucose sensor (FreeStyle Abel 14 Day Sensor) kit; CHECK BLOOD GLUCOSE 4 TIMES DAILY      Disability forms filled out to be faxed. Follow-up and Dispositions    · Return in about 3 months (around 4/13/2021) for follow up. I ADVISED PATIENT TO GO TO ER IF SYMPTOMS WORSEN , CHANGE OR FAILS TO IMPROVE. I spent at least 15 minutes with this established patient, and >50% of the time was spent counseling and/or coordinating care regarding SEE BELOW  [phone]  Subjective:   June Lockett is a 63 y.o. 1960 male  established patient, who was seen for Form Completion (Disability)          1. Hospital discharge follow-up  Recent hospital admission, admitted 12/21/2020, discharge 12/29/2020 at [facility] for UTI and sepsis. Patient has Maier's catheter in place. He was seen by urology and infectious disease during the hospital stay. He was started on antibiotics via PICC line after discharge. Today he got his PICC line removed. He has follow-up appointment with infectious disease physician next month. He also has cystoscopy scheduled at [facility] urology on February 3, 2021. Has podiatry appointment scheduled for January 19, 2021 at 11 AM.  Patient is currently getting PT OT via home health set up by [facility] at discharge. Advised patient to get thyroid ultrasound done as previously ordered in chart.     2. Controlled type 2 diabetes mellitus with complication, with long-term current use of insulin ([address]  Diabetes  Patient presents today for diabetes follow up. Pt. current diabetic medications include Lantus 9 units twice a day. This was changed by [facility] in the hospital.  Before this admission patient was taking 5 units twice a day. Taking medication as prescribed. Current monitoring regimen: home blood tests -4 times daily. Home blood sugar records: fasting range: 110s150s. Any episodes of hypoglycemia? no. Known diabetic complications: peripheral neuropathy. Cardiovascular risk factors: diabetes mellitus. Diabetic Foot and Eye Exam HM Status   Topic Date Due    Eye Exam  07/24/1970    Diabetic Foot Care  10/12/2021     There is no immunization history for the selected administration types on file for this patient. No results found for: MCACR, MCA1, MCA2, MCA3, MCAU, MCAU2, MCALPOCT  Lab Results   Component Value Date/Time    Hemoglobin A1c 5.7 (H) 12/07/2020 12:00 PM              Prior to Admission medications    Medication Sig Start Date End Date Taking? Authorizing Provider   magnesium oxide (MAG-OX) 400 mg tablet TAKE 1 TABLET BY MOUTH DAILY 12/27/20  Yes Provider, Historical   polyethylene glycol (MIRALAX) 17 gram packet DISSOLVE 1 PACKET AS DIRECTED ONCE DAILY 12/28/20  Yes Provider, Historical   flash glucose sensor (FreeStyle Abel 14 Day Sensor) kit CHECK BLOOD GLUCOSE 4 TIMES DAILY 1/13/21  Yes Laurita Jean-Baptiste MD   glipiZIDE (GLUCOTROL) 5 mg tablet Take 1 Tab by mouth two (2) times a day. 1/5/21  Yes Laurita Jean-Baptiste MD   tamsulosin (FLOMAX) 0.4 mg capsule TAKE 1 CAPSULE BY MOUTH AFTER BREAKFAST 12/30/20  Yes Carole Harris MD   clopidogreL (PLAVIX) 75 mg tab Take 1 Tab by mouth daily. 12/30/20  Yes Carole Harris MD   insulin glargine (LANTUS) 100 unit/mL injection 9 Units by SubCUTAneous route two (2) times daily (with meals).  10/26/20  Yes Provider, Historical   acetaminophen (TYLENOL) 325 mg tablet 650 mg = 2 tab each dose, PO/NG, every 4 hours, PRN: Pain-MILD(pain scale)/fever>101.5F/38.6C, # 50 tab, 0 Refills, OTC Med 8/13/20  Yes Provider, Historical   ciprofloxacin HCl (CIPRO) 500 mg tablet  10/9/20  Yes Provider, Historical   terbinafine HCL (LAMISIL) 250 mg tablet Take 1 Tab by mouth daily. Indications: fungal infection of toenail 10/12/20  Yes Luciana Chavez MD   midodrine (PROAMATINE) 5 mg tablet Take 1 Tab by mouth three (3) times daily (with meals). 9/15/20  Yes Luciana Chavez MD   aspirin (ASPIRIN) 325 mg tablet Take 1 Tab by mouth daily. 9/1/20  Yes Luciana Chavez MD   metFORMIN (GLUCOPHAGE) 850 mg tablet TAKE 1 TABLET BY MOUTH TWICE A DAY WITH MEALS 9/1/20  Yes Luciana Chavez MD   senna (SENOKOT) 8.6 mg tablet Take 1 Tab by mouth daily. Indications: NOT TAKING 9/1/20  Yes Luciana Chavez MD   flash glucose sensor (FreeStyle Abel 14 Day Sensor) kit CHECK BLOOD GLUCOSE 4 TIMES DAILY 1/11/21 1/13/21  Luciana Chavez MD   flash glucose sensor (FreeStyle Abel 2 Sensor) kit Check blood glucose 4 x daily 1/5/21   Luciana Chavez MD   flash glucose scanning reader (FreeStyle Abel 14 Day [place]) misc Check blood glucose 4 x a day 12/14/20   Luciana Chavez MD   insulin glargine (LANTUS,BASAGLAR) 100 unit/mL (3 mL) inpn 5 Units by SubCUTAneous route two (2) times daily (after meals).  5 units sq daily with dinner  Indications: type 2 diabetes mellitus 10/27/20 1/13/21  Luciana Chavez MD   Novofine Autocover 30 gauge x 1/3\" ndle TAKE INSULIN AS PRESCRIBED 9/15/20   Provider, Historical   Insulin Needles, Disposable, 30 gauge x 1/3\" Take insulin as prescribed 9/15/20   Luciana Chavez MD     Allergies   Allergen Reactions    Latex Swelling    Banana Swelling       Patient Active Problem List   Diagnosis Code    History of ankle surgery Z98.890    Peripheral arterial occlusive disease ([facility] I77.9    Diabetes mellitus ([facility] E11.9    H/O carotid angioplasty Z98.62    History of common carotid artery stent placement Z98.890, Z95.828    Arterial ischemic stroke, MCA, right, remote, resolved Z86.73    Brain injury ([facility] S06. 9X9A    Type 2 diabetes mellitus with peripheral vascular disease ([facility] E11.51    Hypotension I95.9    PAD (peripheral artery disease) ([facility]) I73.9    TBI (traumatic brain injury) ([facility] S06. 9X5A    CVA (cerebral vascular accident) ([facility] I63.9    Diabetes ([facility] E11.9    Stroke [facility]) I63.9     Patient Active Problem List    Diagnosis Date Noted    Hypotension     PAD (peripheral artery disease) ([facility]     TBI (traumatic brain injury) ([facility]     CVA (cerebral vascular accident) ([facility]     Diabetes ([facility]     Stroke ([facility]     Type 2 diabetes mellitus with peripheral vascular disease ([facility] 09/29/2020    History of ankle surgery 09/02/2020    Peripheral arterial occlusive disease ([facility] 09/02/2020    Diabetes mellitus ([facility] 09/02/2020    H/O carotid angioplasty 09/02/2020    History of common carotid artery stent placement 09/02/2020    Arterial ischemic stroke, MCA, right, remote, resolved 09/02/2020    Brain injury ([facility] 09/02/2020     Current Outpatient Medications   Medication Sig Dispense Refill    magnesium oxide (MAG-OX) 400 mg tablet TAKE 1 TABLET BY MOUTH DAILY      polyethylene glycol (MIRALAX) 17 gram packet DISSOLVE 1 PACKET AS DIRECTED ONCE DAILY      flash glucose sensor (FreeStyle Abel 14 Day Sensor) kit CHECK BLOOD GLUCOSE 4 TIMES DAILY 1 Kit 5    glipiZIDE (GLUCOTROL) 5 mg tablet Take 1 Tab by mouth two (2) times a day. 180 Tab 1    tamsulosin (FLOMAX) 0.4 mg capsule TAKE 1 CAPSULE BY MOUTH AFTER BREAKFAST 90 Cap 1    clopidogreL (PLAVIX) 75 mg tab Take 1 Tab by mouth daily. 90 Tab 1    insulin glargine (LANTUS) 100 unit/mL injection 9 Units by SubCUTAneous route two (2) times daily (with meals).       acetaminophen (TYLENOL) 325 mg tablet 650 mg = 2 tab each dose, PO/NG, every 4 hours, PRN: Pain-MILD(pain scale)/fever>101.5F/38.6C, # 50 tab, 0 Refills, OTC Med      ciprofloxacin HCl (CIPRO) 500 mg tablet       terbinafine HCL (LAMISIL) 250 mg tablet Take 1 Tab by mouth daily. Indications: fungal infection of toenail 90 Tab 0    midodrine (PROAMATINE) 5 mg tablet Take 1 Tab by mouth three (3) times daily (with meals). 90 Tab 5    aspirin (ASPIRIN) 325 mg tablet Take 1 Tab by mouth daily. 90 Tab 5    metFORMIN (GLUCOPHAGE) 850 mg tablet TAKE 1 TABLET BY MOUTH TWICE A DAY WITH MEALS 180 Tab 1    senna (SENOKOT) 8.6 mg tablet Take 1 Tab by mouth daily.  Indications: NOT TAKING 90 Tab 5    flash glucose sensor (FreeStyle Abel 2 Sensor) kit Check blood glucose 4 x daily 1 Kit 5    flash glucose scanning reader (FreeStyle Abel 14 Day [place]) misc Check blood glucose 4 x a day 30 Each 5    Novofine Autocover 30 gauge x 1/3\" ndle TAKE INSULIN AS PRESCRIBED      Insulin Needles, Disposable, 30 gauge x 1/3\" Take insulin as prescribed 100 Pen Needle 11     Allergies   Allergen Reactions    Latex Swelling    Banana Swelling     Past Medical History:   Diagnosis Date    Arterial ischemic stroke, MCA, right, remote, resolved 08/07/2020    Right MCA CVA 8/7/20 --> stenting of occluded YUDY    CVA (cere[facility] vascular accident) ([facility]     Right MCA CVA 8/7/20 --> stenting of occluded YUDY    Diabetes ([address]     H/O carotid angioplasty     RIGHT ICA stenting 8/7/20    History of common carotid artery stent placement     RIGHT ICA stenting 8/7/20    Hypotension     PAD (peripheral artery disease) ([facility]     right LE vascular intervention in distal past    Stroke ([address]     TBI (traumatic brain injury) ([address]     TBI in 2008 ([facility])     Urinary retention      Past Surgical History:   Procedure Laterality Date    VASCULAR SURGERY PROCEDURE UNLIST  08/2020    Carotid Dopler     Family History   Problem Relation Age of Onset    Cancer Mother     Diabetes Mother     Diabetes Father      Social History     Tobacco Use    Smoking status: Former Smoker     Types: Cigarettes     Quit date:      Years since quittin.0    Smokeless tobacco: Never Used   Substance Use Topics    Alcohol use: Never     Frequency: Never       Review of Systems   Constitutional: Negative. HENT: Negative. Eyes: Negative. Respiratory: Negative. Cardiovascular: Negative. Gastrointestinal: Negative. Genitourinary: Negative. Urinary retention, catheter in place   Musculoskeletal: Negative. Skin: Negative. Neurological: Positive for weakness. Negative for dizziness. Status post stroke   Endo/Heme/Allergies: Negative. Psychiatric/Behavioral: Negative. Objective:     No flowsheet data found.      [INSTRUCTIONS:  \"[x]\" Indicates a positive item  \"[]\" Indicates a negative item  -- DELETE ALL ITEMS NOT EXAMINED]    Constitutional: [x] Appears well-developed and well-nourished [x] No apparent distress      [] Abnormal -     Mental status: [x] Alert and awake  [x] Oriented to person/place/time [x] Able to follow commands    [] Abnormal -     Eyes:   EOM    [x]  Normal    [] Abnormal -   Sclera  [x]  Normal    [] Abnormal -          Discharge [x]  None visible   [] Abnormal -     HENT: [x] Normocephalic, atraumatic  [] Abnormal -   [x] Mouth/Throat: Mucous membranes are moist    External Ears [x] Normal  [] Abnormal -    Neck: [x] No visualized mass [] Abnormal -     Pulmonary/Chest: [x] Respiratory effort normal   [x] No visualized signs of difficulty breathing or respiratory distress        [] Abnormal -      Musculoskeletal:   [x] Normal gait with no signs of ataxia         [x] Normal range of motion of neck        [] Abnormal -     Neurological:        [x] No Facial Asymmetry (Cranial nerve 7 motor function) (limited exam due to video visit)          [x] No gaze palsy        [] Abnormal -          Skin:        [x] No significant exanthematous lesions or discoloration noted on facial skin         [] Abnormal -            Psychiatric:       [x] Normal Affect [] Abnormal -        [x] No Hallucinations    Other pertinent observable physical exam findings:-    We discussed the expected course, resolution and complications of the diagnosis(es) in detail. Medication risks, benefits, costs, interactions, and alternatives were discussed as indicated. I advised him to contact the office if his condition worsens, changes or fails to improve as anticipated. He expressed understanding with the diagnosis(es) and plan. Jordan Fox is a 63 y.o. male  is being evaluated by a Virtual Visit (video visit) encounter to address concerns as mentioned above. A caregiver was present when appropriate. Due to this being a TeleHealth encounter (During ZXU-29 public health emergency), evaluation of the following organ systems was limited: Vitals/Constitutional/EENT/Resp/CV/GI//MS/Neuro/Skin/Heme-Lymph-Imm. Pursuant to the emergency declaration under the [address] [place] [address] and the [employer] and [employer] Act, this Virtual Visit was conducted with patient's (and/or legal guardian's) consent, to reduce the patient's risk of exposure to COVID-19 and provide necessary medical care. The patient (and/or legal guardian) has also been advised to contact this office for worsening conditions or problems, and seek emergency medical treatment and/or call 911 if deemed necessary. Patient identification was verified at the start of the visit: YES    Services were provided through a video synchronous discussion virtually to substitute for in-person clinic visit. Patient and provider were located at their individual homes. An electronic signature was used to authenticate this note.       Jazmine Pringle MD

## 2021-01-14 ENCOUNTER — TELEPHONE (OUTPATIENT)
Dept: FAMILY MEDICINE CLINIC | Age: 61
End: 2021-01-14

## 2021-01-14 NOTE — TELEPHONE ENCOUNTER
Additional [facility] medical assessment form filled out signed and placed in my out box. Please fax.  Thanks

## 2021-01-27 LAB — SARS-COV-2, NAA: NEGATIVE

## 2021-02-04 ENCOUNTER — TELEPHONE (OUTPATIENT)
Dept: CARDIOLOGY CLINIC | Age: 61
End: 2021-02-04

## 2021-02-04 NOTE — TELEPHONE ENCOUNTER
Pooja Shaver from [facility] requesting cardiac clearance for patient who recently had EKG changes and has surgery planned with them.      Phone: [phone]

## 2021-02-05 NOTE — TELEPHONE ENCOUNTER
Blockage in left leg; needing to have bypass. Dr. Cherry [facility]    Cx procedure on 2/8/21 d/t needing clearance. Spoke to Ken Clay at [facility], moved to 2/15. Left femoral bypass. EKG was done at [facility] preadmission testing on Thursday. Noted EKG changes. Have requested records from [facility].     If clearance is given, please fax to Ken Clay at: [phone]

## 2021-02-11 ENCOUNTER — OFFICE VISIT (OUTPATIENT)
Dept: CARDIOLOGY CLINIC | Age: 61
End: 2021-02-11
Payer: COMMERCIAL

## 2021-02-11 VITALS
BODY MASS INDEX: 23.23 KG/M2 | DIASTOLIC BLOOD PRESSURE: 48 MMHG | HEIGHT: 74 IN | SYSTOLIC BLOOD PRESSURE: 78 MMHG | WEIGHT: 181 LBS

## 2021-02-11 DIAGNOSIS — I63.9 CEREBROVASCULAR ACCIDENT (CVA), UNSPECIFIED MECHANISM (HCC): Primary | ICD-10-CM

## 2021-02-11 DIAGNOSIS — I73.9 PAD (PERIPHERAL ARTERY DISEASE) (HCC): ICD-10-CM

## 2021-02-11 DIAGNOSIS — I95.9 HYPOTENSION, UNSPECIFIED HYPOTENSION TYPE: ICD-10-CM

## 2021-02-11 DIAGNOSIS — Z01.810 PREOP CARDIOVASCULAR EXAM: ICD-10-CM

## 2021-02-11 PROCEDURE — 93000 ELECTROCARDIOGRAM COMPLETE: CPT | Performed by: SPECIALIST

## 2021-02-11 PROCEDURE — 99214 OFFICE O/P EST MOD 30 MIN: CPT | Performed by: SPECIALIST

## 2021-02-11 RX ORDER — ASPIRIN 81 MG/1
81 TABLET ORAL DAILY
COMMUNITY
End: 2021-12-06
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
## 2021-02-12 ENCOUNTER — ANCILLARY PROCEDURE (OUTPATIENT)
Dept: CARDIOLOGY CLINIC | Age: 61
End: 2021-02-12
Payer: COMMERCIAL

## 2021-02-12 VITALS
SYSTOLIC BLOOD PRESSURE: 90 MMHG | WEIGHT: 181 LBS | DIASTOLIC BLOOD PRESSURE: 60 MMHG | BODY MASS INDEX: 23.23 KG/M2 | HEIGHT: 74 IN

## 2021-02-12 VITALS — WEIGHT: 181 LBS | HEIGHT: 74 IN | BODY MASS INDEX: 23.23 KG/M2

## 2021-02-12 DIAGNOSIS — Z01.810 PREOP CARDIOVASCULAR EXAM: ICD-10-CM

## 2021-02-12 DIAGNOSIS — I73.9 PAD (PERIPHERAL ARTERY DISEASE) (HCC): ICD-10-CM

## 2021-02-12 DIAGNOSIS — I95.9 HYPOTENSION, UNSPECIFIED HYPOTENSION TYPE: ICD-10-CM

## 2021-02-12 DIAGNOSIS — I63.9 CEREBROVASCULAR ACCIDENT (CVA), UNSPECIFIED MECHANISM (HCC): ICD-10-CM

## 2021-02-12 LAB
ECHO AO ASC DIAM: 2.94 CM
ECHO AO ROOT DIAM: 3.17 CM
ECHO AV AREA PEAK VELOCITY: 2.76 CM2
ECHO AV AREA VTI: 2.64 CM2
ECHO AV AREA/BSA PEAK VELOCITY: 1.3 CM2/M2
ECHO AV AREA/BSA VTI: 1.3 CM2/M2
ECHO AV MEAN GRADIENT: 3.76 MMHG
ECHO AV PEAK GRADIENT: 6.03 MMHG
ECHO AV PEAK VELOCITY: 122.8 CM/S
ECHO AV VTI: 20.23 CM
ECHO LA AREA 4C: 18.4 CM2
ECHO LA MAJOR AXIS: 4.58 CM
ECHO LA MINOR AXIS: 2.2 CM
ECHO LA VOL 2C: 58.18 ML (ref 18–58)
ECHO LA VOL 4C: 51.24 ML (ref 18–58)
ECHO LA VOL BP: 60.41 ML (ref 18–58)
ECHO LA VOL/BSA BIPLANE: 29 ML/M2 (ref 16–28)
ECHO LA VOLUME INDEX A2C: 27.93 ML/M2 (ref 16–28)
ECHO LA VOLUME INDEX A4C: 24.6 ML/M2 (ref 16–28)
ECHO LV E' LATERAL VELOCITY: 7.41 CM/S
ECHO LV E' SEPTAL VELOCITY: 5.35 CM/S
ECHO LV EDV A2C: 80.57 ML
ECHO LV EDV A4C: 109.15 ML
ECHO LV EDV BP: 97.44 ML (ref 67–155)
ECHO LV EDV INDEX A4C: 52.4 ML/M2
ECHO LV EDV INDEX BP: 46.8 ML/M2
ECHO LV EDV NDEX A2C: 38.7 ML/M2
ECHO LV EJECTION FRACTION A2C: 58 PERCENT
ECHO LV EJECTION FRACTION A4C: 53 PERCENT
ECHO LV EJECTION FRACTION BIPLANE: 56.1 PERCENT (ref 55–100)
ECHO LV ESV A2C: 33.7 ML
ECHO LV ESV A4C: 50.99 ML
ECHO LV ESV BP: 42.8 ML (ref 22–58)
ECHO LV ESV INDEX A2C: 16.2 ML/M2
ECHO LV ESV INDEX A4C: 24.5 ML/M2
ECHO LV ESV INDEX BP: 20.5 ML/M2
ECHO LV INTERNAL DIMENSION DIASTOLIC: 4.39 CM (ref 4.2–5.9)
ECHO LV INTERNAL DIMENSION SYSTOLIC: 3 CM
ECHO LV IVSD: 1.31 CM (ref 0.6–1)
ECHO LV MASS 2D: 204.7 G (ref 88–224)
ECHO LV MASS INDEX 2D: 98.3 G/M2 (ref 49–115)
ECHO LV POSTERIOR WALL DIASTOLIC: 1.21 CM (ref 0.6–1)
ECHO LVOT DIAM: 2.31 CM
ECHO LVOT PEAK GRADIENT: 2.64 MMHG
ECHO LVOT PEAK VELOCITY: 81.21 CM/S
ECHO LVOT SV: 53.5 ML
ECHO LVOT VTI: 12.79 CM
ECHO MV A VELOCITY: 91.2 CM/S
ECHO MV AREA PHT: 3.05 CM2
ECHO MV E DECELERATION TIME (DT): 248.93 MS
ECHO MV E VELOCITY: 62.01 CM/S
ECHO MV E/A RATIO: 0.68
ECHO MV E/E' LATERAL: 8.37
ECHO MV E/E' RATIO (AVERAGED): 9.98
ECHO MV E/E' SEPTAL: 11.59
ECHO MV PRESSURE HALF TIME (PHT): 72.19 MS
ECHO RA AREA 4C: 10.58 CM2
ECHO RV TAPSE: 1.62 CM (ref 1.5–2)
LA VOL DISK BP: 56 ML (ref 18–58)
STRESS BASELINE DIAS BP: 60 MMHG
STRESS BASELINE HR: 96 BPM
STRESS BASELINE SYS BP: 88 MMHG
STRESS O2 SAT PEAK: 97 %
STRESS O2 SAT REST: 100 %
STRESS PEAK DIAS BP: 62 MMHG
STRESS PEAK SYS BP: 88 MMHG
STRESS PERCENT HR ACHIEVED: 67 %
STRESS POST PEAK HR: 106 BPM
STRESS RATE PRESSURE PRODUCT: 9328 BPM*MMHG
STRESS ST DEPRESSION: 0 MM
STRESS ST ELEVATION: 0 MM
STRESS TARGET HR: 159 BPM

## 2021-02-12 PROCEDURE — 93306 TTE W/DOPPLER COMPLETE: CPT | Performed by: SPECIALIST

## 2021-02-12 PROCEDURE — A9500 TC99M SESTAMIBI: HCPCS | Performed by: SPECIALIST

## 2021-02-12 PROCEDURE — 78452 HT MUSCLE IMAGE SPECT MULT: CPT | Performed by: SPECIALIST

## 2021-02-12 PROCEDURE — 93015 CV STRESS TEST SUPVJ I&R: CPT | Performed by: SPECIALIST

## 2021-02-12 RX ORDER — TETRAKIS(2-METHOXYISOBUTYLISOCYANIDE)COPPER(I) TETRAFLUOROBORATE 1 MG/ML
24.3 INJECTION, POWDER, LYOPHILIZED, FOR SOLUTION INTRAVENOUS ONCE
Status: COMPLETED | OUTPATIENT
Start: 2021-02-12 | End: 2021-02-12

## 2021-02-12 RX ORDER — TETRAKIS(2-METHOXYISOBUTYLISOCYANIDE)COPPER(I) TETRAFLUOROBORATE 1 MG/ML
8.5 INJECTION, POWDER, LYOPHILIZED, FOR SOLUTION INTRAVENOUS ONCE
Status: COMPLETED | OUTPATIENT
Start: 2021-02-12 | End: 2021-02-12

## 2021-02-12 RX ADMIN — TETRAKIS(2-METHOXYISOBUTYLISOCYANIDE)COPPER(I) TETRAFLUOROBORATE 8.5 MILLICURIE: 1 INJECTION, POWDER, LYOPHILIZED, FOR SOLUTION INTRAVENOUS at 09:40

## 2021-02-12 RX ADMIN — TETRAKIS(2-METHOXYISOBUTYLISOCYANIDE)COPPER(I) TETRAFLUOROBORATE 24.3 MILLICURIE: 1 INJECTION, POWDER, LYOPHILIZED, FOR SOLUTION INTRAVENOUS at 11:45

## 2021-02-24 ENCOUNTER — HOSPITAL ENCOUNTER (OUTPATIENT)
Dept: LAB | Age: 61
Discharge: HOME | End: 2021-02-24

## 2021-02-24 LAB
ALBUMIN SERPL-MCNC: 2.5 G/DL (ref 3.5–5)
ALBUMIN/GLOB SERPL: 0.7 {RATIO} (ref 1.1–2.2)
ALP SERPL-CCNC: 73 U/L (ref 45–117)
ALT SERPL-CCNC: 15 U/L (ref 12–78)
ANION GAP SERPL CALC-SCNC: 6 MMOL/L (ref 5–15)
AST SERPL W P-5'-P-CCNC: 11 U/L (ref 15–37)
BASOPHILS # BLD: 0 K/UL (ref 0–0.1)
BASOPHILS NFR BLD: 1 % (ref 0–1)
BILIRUB SERPL-MCNC: 0.7 MG/DL (ref 0.2–1)
BUN SERPL-MCNC: 15 MG/DL (ref 6–20)
BUN/CREAT SERPL: 28 (ref 12–20)
CA-I BLD-MCNC: 8.6 MG/DL (ref 8.5–10.1)
CHLORIDE SERPL-SCNC: 105 MMOL/L (ref 97–108)
CO2 SERPL-SCNC: 27 MMOL/L (ref 21–32)
CREAT SERPL-MCNC: 0.53 MG/DL (ref 0.7–1.3)
DIFFERENTIAL METHOD BLD: ABNORMAL
EOSINOPHIL # BLD: 0.3 K/UL (ref 0–0.4)
EOSINOPHIL NFR BLD: 4 % (ref 0–7)
ERYTHROCYTE [DISTWIDTH] IN BLOOD BY AUTOMATED COUNT: 15 % (ref 11.5–14.5)
EST. AVERAGE GLUCOSE BLD GHB EST-MCNC: 108 MG/DL
GLOBULIN SER CALC-MCNC: 3.6 G/DL (ref 2–4)
GLUCOSE SERPL-MCNC: 80 MG/DL (ref 65–100)
HBA1C MFR BLD: 5.4 % (ref 4–5.6)
HCT VFR BLD AUTO: 29.6 % (ref 36.6–50.3)
HGB BLD-MCNC: 9.2 G/DL (ref 12.1–17)
IMM GRANULOCYTES # BLD AUTO: 0.1 K/UL (ref 0–0.04)
IMM GRANULOCYTES NFR BLD AUTO: 1 % (ref 0–0.5)
LYMPHOCYTES # BLD: 1.7 K/UL (ref 0.8–3.5)
LYMPHOCYTES NFR BLD: 24 % (ref 12–49)
MCH RBC QN AUTO: 29 PG (ref 26–34)
MCHC RBC AUTO-ENTMCNC: 31.1 G/DL (ref 30–36.5)
MCV RBC AUTO: 93.4 FL (ref 80–99)
MONOCYTES # BLD: 0.8 K/UL (ref 0–1)
MONOCYTES NFR BLD: 11 % (ref 5–13)
NEUTS SEG # BLD: 4.4 K/UL (ref 1.8–8)
NEUTS SEG NFR BLD: 59 % (ref 32–75)
PLATELET # BLD AUTO: 363 K/UL (ref 150–400)
PMV BLD AUTO: 11.5 FL (ref 8.9–12.9)
POTASSIUM SERPL-SCNC: 4.2 MMOL/L (ref 3.5–5.1)
PREALB SERPL-MCNC: 18.6 MG/DL (ref 20–40)
PROT SERPL-MCNC: 6.1 G/DL (ref 6.4–8.2)
RBC # BLD AUTO: 3.17 M/UL (ref 4.1–5.7)
SODIUM SERPL-SCNC: 138 MMOL/L (ref 136–145)
WBC # BLD AUTO: 7.2 K/UL (ref 4.1–11.1)

## 2021-02-24 PROCEDURE — 83036 HEMOGLOBIN GLYCOSYLATED A1C: CPT

## 2021-02-24 PROCEDURE — 84134 ASSAY OF PREALBUMIN: CPT

## 2021-02-24 PROCEDURE — 85025 COMPLETE CBC W/AUTO DIFF WBC: CPT

## 2021-02-24 PROCEDURE — 80053 COMPREHEN METABOLIC PANEL: CPT

## 2021-03-02 ENCOUNTER — HOSPITAL ENCOUNTER (OUTPATIENT)
Dept: LAB | Age: 61
Discharge: HOME | End: 2021-03-02

## 2021-03-02 LAB
ANION GAP SERPL CALC-SCNC: 7 MMOL/L (ref 5–15)
BASOPHILS # BLD: 0.1 K/UL (ref 0–0.1)
BASOPHILS NFR BLD: 1 % (ref 0–1)
BUN SERPL-MCNC: 22 MG/DL (ref 6–20)
BUN/CREAT SERPL: 39 (ref 12–20)
CA-I BLD-MCNC: 8.8 MG/DL (ref 8.5–10.1)
CHLORIDE SERPL-SCNC: 106 MMOL/L (ref 97–108)
CO2 SERPL-SCNC: 26 MMOL/L (ref 21–32)
CREAT SERPL-MCNC: 0.56 MG/DL (ref 0.7–1.3)
DIFFERENTIAL METHOD BLD: ABNORMAL
EOSINOPHIL # BLD: 0.3 K/UL (ref 0–0.4)
EOSINOPHIL NFR BLD: 3 % (ref 0–7)
ERYTHROCYTE [DISTWIDTH] IN BLOOD BY AUTOMATED COUNT: 15.5 % (ref 11.5–14.5)
GLUCOSE SERPL-MCNC: 60 MG/DL (ref 65–100)
HCT VFR BLD AUTO: 33.8 % (ref 36.6–50.3)
HGB BLD-MCNC: 10.8 G/DL (ref 12.1–17)
IMM GRANULOCYTES # BLD AUTO: 0.1 K/UL (ref 0–0.04)
IMM GRANULOCYTES NFR BLD AUTO: 1 % (ref 0–0.5)
LYMPHOCYTES # BLD: 2.6 K/UL (ref 0.8–3.5)
LYMPHOCYTES NFR BLD: 31 % (ref 12–49)
MCH RBC QN AUTO: 29.3 PG (ref 26–34)
MCHC RBC AUTO-ENTMCNC: 32 G/DL (ref 30–36.5)
MCV RBC AUTO: 91.8 FL (ref 80–99)
MONOCYTES # BLD: 0.9 K/UL (ref 0–1)
MONOCYTES NFR BLD: 11 % (ref 5–13)
NEUTS SEG # BLD: 4.7 K/UL (ref 1.8–8)
NEUTS SEG NFR BLD: 53 % (ref 32–75)
NRBC # BLD: 0.02 K/UL (ref 0–0.01)
NRBC BLD-RTO: 0.2 PER 100 WBC
PLATELET # BLD AUTO: 311 K/UL (ref 150–400)
PMV BLD AUTO: 12.5 FL (ref 8.9–12.9)
POTASSIUM SERPL-SCNC: 4.2 MMOL/L (ref 3.5–5.1)
RBC # BLD AUTO: 3.68 M/UL (ref 4.1–5.7)
SODIUM SERPL-SCNC: 139 MMOL/L (ref 136–145)
WBC # BLD AUTO: 8.6 K/UL (ref 4.1–11.1)

## 2021-03-02 PROCEDURE — 80048 BASIC METABOLIC PNL TOTAL CA: CPT

## 2021-03-02 PROCEDURE — 85025 COMPLETE CBC W/AUTO DIFF WBC: CPT

## 2021-03-03 ENCOUNTER — HOSPITAL ENCOUNTER (OUTPATIENT)
Dept: LAB | Age: 61
Discharge: HOME | End: 2021-03-03

## 2021-03-03 PROCEDURE — 80048 BASIC METABOLIC PNL TOTAL CA: CPT

## 2021-03-03 PROCEDURE — 85025 COMPLETE CBC W/AUTO DIFF WBC: CPT

## 2021-03-03 PROCEDURE — 36415 COLL VENOUS BLD VENIPUNCTURE: CPT

## 2021-03-04 ENCOUNTER — HOSPITAL ENCOUNTER (OUTPATIENT)
Dept: LAB | Age: 61
Discharge: HOME | End: 2021-03-04

## 2021-03-04 PROCEDURE — 87077 CULTURE AEROBIC IDENTIFY: CPT

## 2021-03-04 PROCEDURE — 87205 SMEAR GRAM STAIN: CPT

## 2021-03-04 PROCEDURE — 87186 SC STD MICRODIL/AGAR DIL: CPT

## 2021-03-07 LAB
BACTERIA SPEC CULT: NORMAL
GRAM STN SPEC: NORMAL
SPECIAL REQUESTS,SREQ: NORMAL

## 2021-03-08 ENCOUNTER — TELEPHONE (OUTPATIENT)
Dept: FAMILY MEDICINE CLINIC | Age: 61
End: 2021-03-08

## 2021-03-08 NOTE — TELEPHONE ENCOUNTER
----- Message from Galileo Chan sent at 3/5/2021 12:37 PM EST -----  Regarding: /Telephone  Referral    Caller (first and last name if not the patient or from practice): Sadie Patel      Caller's relationship to patient (if not from a practice): n/a      Name of caller (if calling from a practice): Sadie Patel      Name of practice:  In [address] [place] [address] title, first, and last name: home health      Office Phone Number: [phone]      Fax number: [phone]      Date and time of appointment:unknown      Reason for appointment:n/a      Details to clarify the request: Caller advised Pt was discharged from Rehab 3/4/21 and will need PT,OT ,Nursing      Galileo Chan

## 2021-03-09 LAB
ANION GAP SERPL CALC-SCNC: 7 MMOL/L (ref 5–15)
BASOPHILS # BLD: 0.1 K/UL (ref 0–0.1)
BASOPHILS NFR BLD: 1 % (ref 0–1)
BUN SERPL-MCNC: 23 MG/DL (ref 6–20)
BUN/CREAT SERPL: 40 (ref 12–20)
CA-I BLD-MCNC: 8.5 MG/DL (ref 8.5–10.1)
CHLORIDE SERPL-SCNC: 106 MMOL/L (ref 97–108)
CO2 SERPL-SCNC: 29 MMOL/L (ref 21–32)
CREAT SERPL-MCNC: 0.58 MG/DL (ref 0.7–1.3)
DIFFERENTIAL METHOD BLD: ABNORMAL
EOSINOPHIL # BLD: 0.4 K/UL (ref 0–0.4)
EOSINOPHIL NFR BLD: 4 % (ref 0–7)
ERYTHROCYTE [DISTWIDTH] IN BLOOD BY AUTOMATED COUNT: 16 % (ref 11.5–14.5)
GLUCOSE SERPL-MCNC: 123 MG/DL (ref 65–100)
HCT VFR BLD AUTO: 32.7 % (ref 36.6–50.3)
HGB BLD-MCNC: 10.1 G/DL (ref 12.1–17)
IMM GRANULOCYTES # BLD AUTO: 0.1 K/UL (ref 0–0.04)
IMM GRANULOCYTES NFR BLD AUTO: 1 % (ref 0–0.5)
LYMPHOCYTES # BLD: 2.8 K/UL (ref 0.8–3.5)
LYMPHOCYTES NFR BLD: 28 % (ref 12–49)
MCH RBC QN AUTO: 29.3 PG (ref 26–34)
MCHC RBC AUTO-ENTMCNC: 30.9 G/DL (ref 30–36.5)
MCV RBC AUTO: 94.8 FL (ref 80–99)
MONOCYTES # BLD: 0.9 K/UL (ref 0–1)
MONOCYTES NFR BLD: 9 % (ref 5–13)
NEUTS SEG # BLD: 5.9 K/UL (ref 1.8–8)
NEUTS SEG NFR BLD: 58 % (ref 32–75)
NRBC # BLD: 0 K/UL (ref 0–0.01)
NRBC BLD-RTO: 0 PER 100 WBC
PLATELET # BLD AUTO: 307 K/UL (ref 150–400)
PMV BLD AUTO: 12.8 FL (ref 8.9–12.9)
POTASSIUM SERPL-SCNC: 4.9 MMOL/L (ref 3.5–5.1)
RBC # BLD AUTO: 3.45 M/UL (ref 4.1–5.7)
SODIUM SERPL-SCNC: 142 MMOL/L (ref 136–145)
WBC # BLD AUTO: 10 K/UL (ref 4.1–11.1)

## 2021-03-11 NOTE — TELEPHONE ENCOUNTER
Please call back to [facility] health and give my verbal order to okay to start PT, OT and home health nursing.   Thanks

## 2021-03-12 ENCOUNTER — VIRTUAL VISIT (OUTPATIENT)
Dept: FAMILY MEDICINE CLINIC | Age: 61
End: 2021-03-12
Payer: COMMERCIAL

## 2021-03-12 DIAGNOSIS — Z09 HOSPITAL DISCHARGE FOLLOW-UP: Primary | ICD-10-CM

## 2021-03-12 DIAGNOSIS — E11.8 CONTROLLED TYPE 2 DIABETES MELLITUS WITH COMPLICATION, WITH LONG-TERM CURRENT USE OF INSULIN (HCC): ICD-10-CM

## 2021-03-12 DIAGNOSIS — Z95.828 STATUS POST VASCULAR BYPASS: ICD-10-CM

## 2021-03-12 DIAGNOSIS — Z79.4 CONTROLLED TYPE 2 DIABETES MELLITUS WITH COMPLICATION, WITH LONG-TERM CURRENT USE OF INSULIN (HCC): ICD-10-CM

## 2021-03-12 PROCEDURE — 99213 OFFICE O/P EST LOW 20 MIN: CPT | Performed by: FAMILY MEDICINE

## 2021-03-12 RX ORDER — FLASH GLUCOSE SENSOR
KIT MISCELLANEOUS
Qty: 1 KIT | Refills: 5 | Status: SHIPPED | OUTPATIENT
Start: 2021-03-12 | End: 2021-08-10
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
## 2021-03-15 ENCOUNTER — DOCUMENTATION ONLY (OUTPATIENT)
Dept: FAMILY MEDICINE CLINIC | Age: 61
End: 2021-03-15

## 2021-03-30 ENCOUNTER — TELEPHONE (OUTPATIENT)
Dept: FAMILY MEDICINE CLINIC | Age: 61
End: 2021-03-30

## 2021-03-30 NOTE — TELEPHONE ENCOUNTER
----- Message from Kashif Mcclure sent at 3/26/2021 10:07 AM EDT -----  Regarding: /Telephone  General Message/Vendor Calls    Caller's first and last name: Pamela Bullock Physical Therapist w/ [facility]       Reason for call: Requesting verbal orders for home health services      Callback required yes/no and why: yes- to confirm       Best contact number(s): [phone]      Details to clarify the request: N/A       Kashif Mcclure

## 2021-03-31 ENCOUNTER — TELEPHONE (OUTPATIENT)
Dept: FAMILY MEDICINE CLINIC | Age: 61
End: 2021-03-31

## 2021-04-05 ENCOUNTER — TELEPHONE (OUTPATIENT)
Dept: FAMILY MEDICINE CLINIC | Age: 61
End: 2021-04-05

## 2021-04-05 NOTE — TELEPHONE ENCOUNTER
----- Message from Grant Cox sent at 4/5/2021  8:36 AM EDT -----  Regarding: Dr. Marina Ramirez Message/Vendor Calls    Caller's first and last name: Mrs. Austin Pitchclaire      Reason for call: Was discharged from [facility] 3/23/21. Diagnosis was AMS, vomiting and facial droop.  Changed BP med \"fludro cortizone\" 0.1 mg      Callback required yes/no and why: no      Best contact number(s): [phone]      Details to clarify the request: [address]

## 2021-04-10 DIAGNOSIS — E11.8 CONTROLLED TYPE 2 DIABETES MELLITUS WITH COMPLICATION, WITH LONG-TERM CURRENT USE OF INSULIN (HCC): ICD-10-CM

## 2021-04-10 DIAGNOSIS — Z79.4 CONTROLLED TYPE 2 DIABETES MELLITUS WITH COMPLICATION, WITH LONG-TERM CURRENT USE OF INSULIN (HCC): ICD-10-CM

## 2021-04-12 ENCOUNTER — TELEPHONE (OUTPATIENT)
Dept: FAMILY MEDICINE CLINIC | Age: 61
End: 2021-04-12

## 2021-04-12 RX ORDER — METFORMIN HYDROCHLORIDE 850 MG/1
TABLET ORAL
Qty: 60 TAB | Refills: 5 | Status: SHIPPED | OUTPATIENT
Start: 2021-04-12 | End: 2021-07-08 | Stop reason: SDUPTHER

## 2021-04-12 NOTE — TELEPHONE ENCOUNTER
----- Message from Michelle Amador sent at 4/12/2021  3:08 PM EDT -----  Regarding: Discussion of Medication/Symptoms  Contact: [phone]  General Message/Vendor Calls    Caller's first and last name:Ivania from [address] Dung Harris. Reason for call:Requesting to discuss medication for loose stools that patient can take for relief that may be gentler for stomach. Callback required yes/no and why: Yes, discussion of medications for loose stools. Best contact number(s): [phone]      Details to clarify the request:Ivania from [address] Dung Harris calling about patient experiencing loose stool. Patient states has been taking one Imodium every other day to assist with the loose stools, but Karina Sarmiento requesting to see if there is something that may be more gentle that can be taken daily for patient stomach and loose stools to get better. Please follow up with [facility] representative Karina Sarmiento at earliest convenience.       Michelle Amador

## 2021-04-16 ENCOUNTER — TELEPHONE (OUTPATIENT)
Dept: FAMILY MEDICINE CLINIC | Age: 61
End: 2021-04-16

## 2021-04-16 NOTE — TELEPHONE ENCOUNTER
----- Message from [employer] sent at 4/16/2021  9:24 AM EDT -----  Regarding: Dr. Mitzi Betancourt Message/Vendor Calls    Caller's first and last name: Eugene Joy ,brother in law      Reason for call: Information for ultrasound. Callback required yes/no and why: yes      Best contact number(s): [phone]      Details to clarify the request: Patient's brother in law misplaced the information of where the patient needs to go to have the ultrasound done for his thyroid.       [employer]

## 2021-04-16 NOTE — TELEPHONE ENCOUNTER
Pt states that he is doing better. The [facility] the one that prescribed and he does not need an appt at this time.

## 2021-04-19 NOTE — TELEPHONE ENCOUNTER
Please call back and give number for central scheduling: [phone]. Patient to call central scheduling and schedule ultrasound.   Thanks

## 2021-04-21 ENCOUNTER — PATIENT MESSAGE (OUTPATIENT)
Dept: FAMILY MEDICINE CLINIC | Age: 61
End: 2021-04-21

## 2021-04-26 ENCOUNTER — TELEPHONE (OUTPATIENT)
Dept: FAMILY MEDICINE CLINIC | Age: 61
End: 2021-04-26

## 2021-04-26 NOTE — TELEPHONE ENCOUNTER
----- Message from John Chain sent at 4/26/2021 12:45 PM EDT -----  Regarding: Order to Apply Cream  Contact: [phone]  General Message/Vendor Calls    Caller's first and last name: Ana Amos calling from [facility]. Reason for call: Requesting to apply cream to penile area due to tear from catheter being picked at by patient. Callback required yes/no and why:Yes, confirmation of order being sent to apply cream to penile area for catheter tear. Best contact number(s):[phone]      Details to clarify the request:Ivania calling from [facility] to get order for cream to be applied to patient penile area due to tear from catheter being picked at by patient at least twice a day. Third party identifier provided.        John Chain

## 2021-04-27 NOTE — TELEPHONE ENCOUNTER
Please call back, give my verbal order. \"Okay to start cream on penile area due to catheter tear twice a day\".   Thanks

## 2021-04-28 ENCOUNTER — HOSPITAL ENCOUNTER (OUTPATIENT)
Dept: ULTRASOUND IMAGING | Age: 61
Discharge: HOME | End: 2021-04-28
Payer: COMMERCIAL

## 2021-04-28 DIAGNOSIS — E05.90 HYPERTHYROIDISM: Primary | ICD-10-CM

## 2021-04-28 DIAGNOSIS — E04.2 MULTIPLE THYROID NODULES: ICD-10-CM

## 2021-04-28 DIAGNOSIS — E05.90 HYPERTHYROIDISM: ICD-10-CM

## 2021-04-28 PROCEDURE — 76536 US EXAM OF HEAD AND NECK: CPT

## 2021-04-28 NOTE — PROGRESS NOTES
Please call inform patient, I have placed referral for endocrinologist.  Patient to make appointment with endocrinologist for further evaluation of multiple thyroid nodules and hyperthyroidism. Please give referral info to patient.   Thanks

## 2021-04-29 ENCOUNTER — TELEPHONE (OUTPATIENT)
Dept: FAMILY MEDICINE CLINIC | Age: 61
End: 2021-04-29

## 2021-04-29 NOTE — TELEPHONE ENCOUNTER
Faxed referral letter to Dr. Kashif Hicks along with thyroid ultra sound. They will call him. Mr. Santi Bahena notified of faxed letter.   [phone]

## 2021-04-30 ENCOUNTER — TELEPHONE (OUTPATIENT)
Dept: FAMILY MEDICINE CLINIC | Age: 61
End: 2021-04-30

## 2021-04-30 NOTE — TELEPHONE ENCOUNTER
----- Message from Jose Santana sent at 4/30/2021  3:28 PM EDT -----  Regarding: Dr. Laura Gaviria: [phone]  General Message/Vendor Calls    Caller's first and last name: Haylee Osborne/ Brother      Reason for call: First available appointment w/ Dr. Claudette Cheese is not until 10/13/2021, and is virtual. The scheduling  said you would need to get a message to them if you would like to request a sooner appt. Please advise. Callback required yes/no and why: Yes, requested.       Best contact number(s):[phone]      Details to clarify the request: N/a      Jose Santana

## 2021-05-03 ENCOUNTER — OFFICE VISIT (OUTPATIENT)
Dept: CARDIOLOGY CLINIC | Age: 61
End: 2021-05-03
Payer: COMMERCIAL

## 2021-05-03 VITALS
DIASTOLIC BLOOD PRESSURE: 48 MMHG | BODY MASS INDEX: 24.9 KG/M2 | HEIGHT: 74 IN | HEART RATE: 88 BPM | OXYGEN SATURATION: 97 % | SYSTOLIC BLOOD PRESSURE: 90 MMHG | WEIGHT: 194 LBS

## 2021-05-03 DIAGNOSIS — E05.90 HYPERTHYROIDISM: Primary | ICD-10-CM

## 2021-05-03 DIAGNOSIS — I73.9 PAD (PERIPHERAL ARTERY DISEASE) (HCC): Primary | ICD-10-CM

## 2021-05-03 DIAGNOSIS — I63.9 CEREBROVASCULAR ACCIDENT (CVA), UNSPECIFIED MECHANISM (HCC): ICD-10-CM

## 2021-05-03 DIAGNOSIS — E04.2 MULTIPLE THYROID NODULES: ICD-10-CM

## 2021-05-03 DIAGNOSIS — E07.9 THYROID DISEASE: ICD-10-CM

## 2021-05-03 PROCEDURE — 99214 OFFICE O/P EST MOD 30 MIN: CPT | Performed by: SPECIALIST

## 2021-05-03 RX ORDER — FLUDROCORTISONE ACETATE 0.1 MG/1
0.1 TABLET ORAL 2 TIMES DAILY
Qty: 180 TAB | Refills: 3 | Status: SHIPPED | OUTPATIENT
Start: 2021-05-03

## 2021-05-03 RX ORDER — AMOXICILLIN 875 MG/1
875 TABLET, FILM COATED ORAL 2 TIMES DAILY
COMMUNITY
End: 2021-08-10

## 2021-05-03 RX ORDER — FLUDROCORTISONE ACETATE 0.1 MG/1
0.1 TABLET ORAL 2 TIMES DAILY
COMMUNITY
End: 2021-05-03 | Stop reason: SDUPTHER

## 2021-05-03 NOTE — TELEPHONE ENCOUNTER
New endocrinology referral placed in chart. Please give referral information to patient. Patient to make appointment with any available endocrinologist in this practice.   Thanks

## 2021-05-03 NOTE — TELEPHONE ENCOUNTER
Pt brother states that he could not schedule with anyone of the providers. Due to short staffing issues until October and it is for [facility] only.     Please advise

## 2021-05-03 NOTE — TELEPHONE ENCOUNTER
Please call back and inform, patient to make appointment with any first available endocrinologist in that office.   Please let me know if there is no available appointments then I will refer to an outside endocrinologist.  Thanks
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
## 2021-05-03 NOTE — PROGRESS NOTES
Chief Complaint   Patient presents with    Follow-up     6 weeks    Cerebrovascular Accident    Other    Hypotension     Visit Vitals  BP (!) 90/48 (BP 1 Location: Right upper arm, BP Patient Position: Sitting, BP Cuff Size: Adult)   Pulse 88   Ht 6' 2\" (1.88 m)   Wt 194 lb (88 kg)   SpO2 97%   BMI 24.91 kg/m²     Chest pain denied   SOB denied   Palpitations denied   Swelling in hands/feet denied   Dizziness denied   Recent hospital stays denied   Refills fludrocortisone    After CVA, trying to r/o Afib; asking about 30 day monitor.

## 2021-05-06 ENCOUNTER — TELEPHONE (OUTPATIENT)
Dept: CARDIOLOGY CLINIC | Age: 61
End: 2021-05-06

## 2021-05-06 RX ORDER — ROSUVASTATIN CALCIUM 10 MG/1
10 TABLET, COATED ORAL
Qty: 30 TAB | Refills: 5 | Status: SHIPPED | OUTPATIENT
Start: 2021-05-06 | End: 2021-10-18 | Stop reason: SDUPTHER

## 2021-05-06 NOTE — TELEPHONE ENCOUNTER
Good morning,    Patient's brother in law states that he is calling to have Crestor refilled at the [facility] on file. Please call to confirm. [phone].     Thanks,  Cameron Mccullough

## 2021-05-06 NOTE — TELEPHONE ENCOUNTER
Per Dr. Jostin Ball: Joss Coe start a statin (crestor 10 mg daily) - reviewed risks \"  Refill per VO of Dr. Jostin Ball:  Last appt: 5/3/2021  Future Appointments   Date Time Provider Ian Flores   6/7/2021  9:00 AM Gaye Cuellar MD [facility] BS AMB   10/13/2021  2:50 PM Kolleen Burkitt, MD R[facility] [address] [address] [facility] AMB   11/10/2021 11:20 AM MD SHONDA Andrews BS AMB       Requested Prescriptions     Signed Prescriptions Disp Refills    rosuvastatin (CRESTOR) 10 mg tablet 30 Tab 5     Sig: Take 1 Tab by mouth nightly. Authorizing Provider: Cindy Schrader     Ordering User: Bere Grider ([address]) at [phone]. Let him know of possible side effects.

## 2021-05-08 LAB
BUN SERPL-MCNC: 17 MG/DL (ref 8–27)
BUN/CREAT SERPL: 25 (ref 10–24)
CALCIUM SERPL-MCNC: 9.1 MG/DL (ref 8.6–10.2)
CHLORIDE SERPL-SCNC: 95 MMOL/L (ref 96–106)
CO2 SERPL-SCNC: 25 MMOL/L (ref 20–29)
CREAT SERPL-MCNC: 0.67 MG/DL (ref 0.76–1.27)
GLUCOSE SERPL-MCNC: 231 MG/DL (ref 65–99)
POTASSIUM SERPL-SCNC: 4.3 MMOL/L (ref 3.5–5.2)
SODIUM SERPL-SCNC: 134 MMOL/L (ref 134–144)
T4 FREE SERPL-MCNC: 1.36 NG/DL (ref 0.82–1.77)
TSH SERPL DL<=0.005 MIU/L-ACNC: 0.03 UIU/ML (ref 0.45–4.5)

## 2021-05-14 ENCOUNTER — VIRTUAL VISIT (OUTPATIENT)
Dept: FAMILY MEDICINE CLINIC | Age: 61
End: 2021-05-14
Payer: COMMERCIAL

## 2021-05-14 DIAGNOSIS — Z79.4 CONTROLLED TYPE 2 DIABETES MELLITUS WITH COMPLICATION, WITH LONG-TERM CURRENT USE OF INSULIN (HCC): ICD-10-CM

## 2021-05-14 DIAGNOSIS — E11.8 CONTROLLED TYPE 2 DIABETES MELLITUS WITH COMPLICATION, WITH LONG-TERM CURRENT USE OF INSULIN (HCC): ICD-10-CM

## 2021-05-14 PROCEDURE — 99213 OFFICE O/P EST LOW 20 MIN: CPT | Performed by: FAMILY MEDICINE

## 2021-05-14 RX ORDER — PEN NEEDLE, DIABETIC 31 GX3/16"
NEEDLE, DISPOSABLE MISCELLANEOUS
Qty: 100 PEN NEEDLE | Refills: 5 | Status: SHIPPED | OUTPATIENT
Start: 2021-05-14

## 2021-05-14 RX ORDER — INSULIN GLARGINE 100 [IU]/ML
10 INJECTION, SOLUTION SUBCUTANEOUS 2 TIMES DAILY
Qty: 3 ADJUSTABLE DOSE PRE-FILLED PEN SYRINGE | Refills: 5 | Status: SHIPPED | OUTPATIENT
Start: 2021-05-14
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
## 2021-05-19 ENCOUNTER — TELEPHONE (OUTPATIENT)
Dept: FAMILY MEDICINE CLINIC | Age: 61
End: 2021-05-19

## 2021-05-25 ENCOUNTER — TELEPHONE (OUTPATIENT)
Dept: FAMILY MEDICINE CLINIC | Age: 61
End: 2021-05-25

## 2021-05-25 NOTE — TELEPHONE ENCOUNTER
Faxed to number provided          ----- Message from [facility] sent at 5/25/2021  1:28 PM EDT -----  Regarding: DR. NEWTON/TELEPHONE  General Message/Vendor Calls    Caller's first and last name: Nato Bernalhugo Stout      Reason for call: NEEDING ALL RECORDS AND IMAGING ON PT SENT OVER PLEASE. PT IS IN OFFICE NOW.  [address] required yes/no and why: YES      Best contact number(s):  [phone]      Details to clarify the request: [facility] [facility]

## 2021-06-02 ENCOUNTER — TRANSCRIBE ORDER (OUTPATIENT)
Dept: SCHEDULING | Age: 61
End: 2021-06-02

## 2021-06-02 DIAGNOSIS — E04.2 MULTINODULAR GOITER: Primary | ICD-10-CM

## 2021-06-03 ENCOUNTER — TELEPHONE (OUTPATIENT)
Dept: FAMILY MEDICINE CLINIC | Age: 61
End: 2021-06-03

## 2021-06-08 ENCOUNTER — TELEPHONE (OUTPATIENT)
Dept: FAMILY MEDICINE CLINIC | Age: 61
End: 2021-06-08

## 2021-06-08 ENCOUNTER — VIRTUAL VISIT (OUTPATIENT)
Dept: FAMILY MEDICINE CLINIC | Age: 61
End: 2021-06-08
Payer: COMMERCIAL

## 2021-06-08 DIAGNOSIS — I95.9 HYPOTENSION, UNSPECIFIED HYPOTENSION TYPE: ICD-10-CM

## 2021-06-08 DIAGNOSIS — L97.521 DIABETIC ULCER OF LEFT FOOT ASSOCIATED WITH DIABETES MELLITUS DUE TO UNDERLYING CONDITION, LIMITED TO BREAKDOWN OF SKIN, UNSPECIFIED PART OF FOOT (HCC): ICD-10-CM

## 2021-06-08 DIAGNOSIS — Z97.8 FOLEY CATHETER IN PLACE: ICD-10-CM

## 2021-06-08 DIAGNOSIS — E08.621 DIABETIC ULCER OF LEFT FOOT ASSOCIATED WITH DIABETES MELLITUS DUE TO UNDERLYING CONDITION, LIMITED TO BREAKDOWN OF SKIN, UNSPECIFIED PART OF FOOT (HCC): ICD-10-CM

## 2021-06-08 DIAGNOSIS — S06.9X9S TRAUMATIC BRAIN INJURY WITH LOSS OF CONSCIOUSNESS, SEQUELA (HCC): ICD-10-CM

## 2021-06-08 DIAGNOSIS — Z78.9 IMPAIRED MOBILITY AND ADLS: ICD-10-CM

## 2021-06-08 DIAGNOSIS — I63.9 CEREBROVASCULAR ACCIDENT (CVA), UNSPECIFIED MECHANISM (HCC): Primary | ICD-10-CM

## 2021-06-08 DIAGNOSIS — Z74.09 IMPAIRED MOBILITY AND ADLS: ICD-10-CM

## 2021-06-08 DIAGNOSIS — N39.0 RECURRENT UTI: ICD-10-CM

## 2021-06-08 DIAGNOSIS — E04.2 MULTIPLE THYROID NODULES: ICD-10-CM

## 2021-06-08 DIAGNOSIS — R29.898 LEFT ARM WEAKNESS: ICD-10-CM

## 2021-06-08 DIAGNOSIS — Z95.828 STATUS POST VASCULAR BYPASS: ICD-10-CM

## 2021-06-08 DIAGNOSIS — I77.9 PERIPHERAL ARTERIAL OCCLUSIVE DISEASE (HCC): ICD-10-CM

## 2021-06-08 DIAGNOSIS — R33.9 URINARY RETENTION: ICD-10-CM

## 2021-06-08 PROCEDURE — 99214 OFFICE O/P EST MOD 30 MIN: CPT | Performed by: FAMILY MEDICINE

## 2021-06-08 RX ORDER — CIPROFLOXACIN 500 MG/1
500 TABLET ORAL 2 TIMES DAILY
COMMUNITY
End: 2021-08-10

## 2021-06-08 RX ORDER — LANOLIN ALCOHOL/MO/W.PET/CERES
CREAM (GRAM) TOPICAL
COMMUNITY
Start: 2021-03-04 | End: 2021-08-10 | Stop reason: SDUPTHER

## 2021-06-08 NOTE — PROGRESS NOTES
Patient stated name &   Chief Complaint   Patient presents with    Form Completion     Short term disability        Health Maintenance Due   Topic    Pneumococcal 0-64 years (1 of 2 - PPSV23)    MICROALBUMIN Q1     Eye Exam Retinal or Dilated     DTaP/Tdap/Td series (1 - Tdap)    Shingrix Vaccine Age 49> (1 of 2)    Colorectal Cancer Screening Combo        Wt Readings from Last 3 Encounters:   21 194 lb (88 kg)   21 181 lb (82.1 kg)   21 181 lb (82.1 kg)     Temp Readings from Last 3 Encounters:   20 97.7 °F (36.5 °C) (Oral)   10/27/20 97.3 °F (36.3 °C) (Oral)   10/12/20 98.3 °F (36.8 °C) (Oral)     BP Readings from Last 3 Encounters:   21 (!) 90/48   21 90/60   21 (!) 78/48     Pulse Readings from Last 3 Encounters:   21 88   20 (!) 126   20 (!) 104         Learning Assessment:  :     No flowsheet data found. Depression Screening:  :     3 most recent PHQ Screens 2021   Little interest or pleasure in doing things Not at all   Feeling down, depressed, irritable, or hopeless Not at all   Total Score PHQ 2 0       Fall Risk Assessment:  :     Fall Risk Assessment, last 12 mths 2021   Able to walk? Yes   Fall in past 12 months? 1   Are you worried about falling 0   Is the gait abnormal? 1   Number of falls in past 12 months 1   Fall with injury? 0       Abuse Screening:  :     Abuse Screening Questionnaire 2021   Do you ever feel afraid of your partner? N   Are you in a relationship with someone who physically or mentally threatens you? N   Is it safe for you to go home? Y       Coordination of Care Questionnaire:  :     1) Have you been to an emergency room, urgent care clinic since your last visit? Yes last Friday  PT 1st  Hospitalized since your last visit? No             2) Have you seen or consulted any other health care providers outside of [address] [place] [address] since your last visit?  Yes  See above  (Include any pap smears or colon screenings in this section.)    Patient is accompanied by VV I have received verbal consent from Gregory Craig to discuss any/all medical information while they are present in the room.
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
## 2021-06-08 NOTE — TELEPHONE ENCOUNTER
Please fax Willis Gutierrez forms along with my visit note from today. Also please make copies for patient to  from  prior to fax. Forms filled out, signed and placed in my out box thanks      Done. Mr. Tara Roblero was notified.   [email or web address]

## 2021-06-08 NOTE — TELEPHONE ENCOUNTER
Please fax Adolm Jane forms along with my visit note from today. Also please make copies for patient to  from  prior to fax.     Forms filled out, signed and placed in my out box thanks

## 2021-06-23 ENCOUNTER — TELEPHONE (OUTPATIENT)
Dept: CARDIOLOGY CLINIC | Age: 61
End: 2021-06-23

## 2021-06-28 ENCOUNTER — TELEPHONE (OUTPATIENT)
Dept: FAMILY MEDICINE CLINIC | Age: 61
End: 2021-06-28

## 2021-06-28 NOTE — TELEPHONE ENCOUNTER
----- Message from Severo Holguin sent at 6/28/2021 12:04 PM EDT -----  Regarding: Dr. Balderrama Quiet: [phone]  General Message/Vendor Calls    Caller's first and last name: Pt.       Reason for call: RN has been trying to contact pcp. Callback required yes/no and why: Yes, needs office to call nurse back. Best contact number(s): [phone]      Details to clarify the request: N/a.       Severo Holguin

## 2021-06-28 NOTE — TELEPHONE ENCOUNTER
----- Message from Johnathon Reyes sent at 6/28/2021 11:41 AM EDT -----  Regarding: Dr. Veronica Pa Message/Vendor Calls    Caller's first and last name: [facility] home health      Reason for call: Urine came back with no growth      Callback required yes/no and why: yes      Best contact number(s):[phone]      Details to clarify the request: [address]

## 2021-07-08 DIAGNOSIS — E11.8 CONTROLLED TYPE 2 DIABETES MELLITUS WITH COMPLICATION, WITH LONG-TERM CURRENT USE OF INSULIN (HCC): ICD-10-CM

## 2021-07-08 DIAGNOSIS — Z79.4 CONTROLLED TYPE 2 DIABETES MELLITUS WITH COMPLICATION, WITH LONG-TERM CURRENT USE OF INSULIN (HCC): ICD-10-CM

## 2021-07-08 RX ORDER — GLIPIZIDE 5 MG/1
5 TABLET ORAL 2 TIMES DAILY
Qty: 180 TABLET | Refills: 1 | Status: SHIPPED | OUTPATIENT
Start: 2021-07-08 | End: 2021-10-18 | Stop reason: SDUPTHER

## 2021-07-08 RX ORDER — METFORMIN HYDROCHLORIDE 850 MG/1
TABLET ORAL
Qty: 60 TABLET | Refills: 5 | Status: SHIPPED | OUTPATIENT
Start: 2021-07-08 | End: 2021-10-04

## 2021-07-08 NOTE — TELEPHONE ENCOUNTER
PCP: Yoana Mi MD    Last appt: 6/8/2021  Future Appointments   Date Time Provider Ian Flores   10/13/2021  2:50 PM Jatin Nassar MD RDE [facility] BS AMB   11/10/2021 11:20 AM MD SHONDA Higginbotham BS AMB       Requested Prescriptions     Pending Prescriptions Disp Refills    glipiZIDE (GLUCOTROL) 5 mg tablet 180 Tablet 1     Sig: Take 1 Tablet by mouth two (2) times a day.  metFORMIN (GLUCOPHAGE) 850 mg tablet 60 Tablet 5     Sig: .        Prior labs and Blood pressures:  BP Readings from Last 3 Encounters:   05/03/21 (!) 90/48   02/12/21 90/60   02/11/21 (!) 78/48     Lab Results   Component Value Date/Time    Sodium 134 05/07/2021 01:57 PM    Potassium 4.3 05/07/2021 01:57 PM    Chloride 95 (L) 05/07/2021 01:57 PM    CO2 25 05/07/2021 01:57 PM    Anion gap 7 03/03/2021 05:55 PM    Glucose 231 (H) 05/07/2021 01:57 PM    BUN 17 05/07/2021 01:57 PM    Creatinine 0.67 (L) 05/07/2021 01:57 PM    BUN/Creatinine ratio 25 (H) 05/07/2021 01:57 PM    GFR est  05/07/2021 01:57 PM    GFR est non- 05/07/2021 01:57 PM    Calcium 9.1 05/07/2021 01:57 PM     Lab Results   Component Value Date/Time    Hemoglobin A1c 5.4 02/24/2021 06:32 AM     Lab Results   Component Value Date/Time    Cholesterol, total 146 09/01/2020 12:34 PM    HDL Cholesterol 61 09/01/2020 12:34 PM    LDL, calculated 67.8 09/01/2020 12:34 PM    VLDL, calculated 17.2 09/01/2020 12:34 PM    Triglyceride 86 09/01/2020 12:34 PM    CHOL/HDL Ratio 2.4 09/01/2020 12:34 PM     No results found for: MICHELLE Villafuerte    Lab Results   Component Value Date/Time    TSH 0.029 (L) 05/07/2021 01:57 PM

## 2021-08-10 ENCOUNTER — VIRTUAL VISIT (OUTPATIENT)
Dept: FAMILY MEDICINE CLINIC | Age: 61
End: 2021-08-10
Payer: COMMERCIAL

## 2021-08-10 DIAGNOSIS — Z12.11 SCREEN FOR COLON CANCER: ICD-10-CM

## 2021-08-10 DIAGNOSIS — D64.9 ANEMIA, UNSPECIFIED TYPE: Primary | ICD-10-CM

## 2021-08-10 PROCEDURE — 99213 OFFICE O/P EST LOW 20 MIN: CPT | Performed by: FAMILY MEDICINE

## 2021-08-10 RX ORDER — LANOLIN ALCOHOL/MO/W.PET/CERES
325 CREAM (GRAM) TOPICAL
Qty: 90 TABLET | Refills: 5 | Status: SHIPPED | OUTPATIENT
Start: 2021-08-10 | End: 2021-12-06

## 2021-08-10 NOTE — PROGRESS NOTES
Patient stated name &     Chief Complaint   Patient presents with    Follow-up     Endocrinologist sent lab results for anemia        Health Maintenance Due   Topic    MICROALBUMIN Q1     Eye Exam Retinal or Dilated     DTaP/Tdap/Td series (1 - Tdap)    Colorectal Cancer Screening Combo     Shingrix Vaccine Age 50> (1 of 2)       Wt Readings from Last 3 Encounters:   21 194 lb (88 kg)   21 181 lb (82.1 kg)   21 181 lb (82.1 kg)     Temp Readings from Last 3 Encounters:   20 97.7 °F (36.5 °C) (Oral)   10/27/20 97.3 °F (36.3 °C) (Oral)   10/12/20 98.3 °F (36.8 °C) (Oral)     BP Readings from Last 3 Encounters:   21 (!) 90/48   21 90/60   21 (!) 78/48     Pulse Readings from Last 3 Encounters:   21 88   20 (!) 126   20 (!) 104         Learning Assessment:  :     No flowsheet data found. Depression Screening:  :     3 most recent PHQ Screens 2021   Little interest or pleasure in doing things Not at all   Feeling down, depressed, irritable, or hopeless Not at all   Total Score PHQ 2 0       Fall Risk Assessment:  :     Fall Risk Assessment, last 12 mths 2021   Able to walk? Yes   Fall in past 12 months? 1   Are you worried about falling 0   Is the gait abnormal? 1   Number of falls in past 12 months 1   Fall with injury? 0       Abuse Screening:  :     Abuse Screening Questionnaire 2021   Do you ever feel afraid of your partner? N   Are you in a relationship with someone who physically or mentally threatens you? N   Is it safe for you to go home? Y       Coordination of Care Questionnaire:  :     1) Have you been to an emergency room, urgent care clinic since your last visit? No    Hospitalized since your last visit? No             2) Have you seen or consulted any other health care providers outside of [address] [place] [address] since your last visit?  No  (Include any pap smears or colon screenings in this section.)    Patient is accompanied by Email  I have received verbal consent from Stephan Parham to discuss any/all medical information while they are present in the room.

## 2021-08-10 NOTE — PROGRESS NOTES
Consent: Sarah Covarrubias, who was seen by synchronous (real-time) audio-video technology, and/or his healthcare decision maker, is aware that this patient-initiated, Telehealth encounter on 8/10/2021 is a billable service, with coverage as determined by his insurance carrier. He is aware that he may receive a bill and has provided verbal consent to proceed: Yes. Assessment & Plan:   Diagnoses and all orders for this visit:    1. Anemia, unspecified type  -     CBC WITH AUTOMATED DIFF; Future  -     IRON PROFILE; Future  -     ferrous sulfate 325 mg (65 mg iron) tablet; Take 1 Tablet by mouth daily (with dinner). 2. Screen for colon cancer  -     REFERRAL TO GASTROENTEROLOGY          Follow-up and Dispositions    · Return in about 3 months (around 11/10/2021) for follow up. I ADVISED PATIENT TO GO TO ER IF SYMPTOMS WORSEN , CHANGE OR FAILS TO IMPROVE. I spent at least 15 minutes with this established patient, and >50% of the time was spent counseling and/or coordinating care regarding SEE BELOW  712  Subjective:   Sarah Covarrubias is a 57 y.o. 1960 male  established patient, who was seen for Follow-up (Endocrinologist sent lab results for anemia)          1. Screen for colon cancer  Not up-to-date on colonoscopy. I have previously referred him twice previously for colonoscopy but patient has not been able to schedule the appointment yet. I stressed the importance of colonoscopy and colon cancer screening with patient today. 2. Anemia, unspecified type  Patient is here for evaluation of anemia. He recently had CBC done at endocrinologist Dr. Juliano Redd office, hemoglobin 9.8, hematocrit 29.8. Patient was previously prescribed iron supplement for anemia. He has not been taking his iron supplement as prescribed. I will refill prescription for iron. Advised patient to start taking iron supplement and call if he develops constipation or any other side effects.   I will also check iron levels. Patient reports he had nuclear scan done for thyroid at [facility], this was ordered by his endocrinologist Dr. More Jang. Reports he was advised to follow-up in 1 year for thyroid abnormality. Prior to Admission medications    Medication Sig Start Date End Date Taking? Authorizing Provider   ferrous sulfate 325 mg (65 mg iron) tablet Take 1 Tablet by mouth daily (with dinner). 8/10/21  Yes Mercedes Levine MD   glipiZIDE (GLUCOTROL) 5 mg tablet Take 1 Tablet by mouth two (2) times a day. Patient taking differently: Take 5 mg by mouth daily. 7/8/21  Yes Mercedes Levine MD   metFORMIN (GLUCOPHAGE) 850 mg tablet . 7/8/21  Yes Mercedes Levine MD   insulin glargine (LANTUS,BASAGLAR) 100 unit/mL (3 mL) inpn 10 Units by SubCUTAneous route two (2) times a day. 5/14/21  Yes Mercedes Levine MD   rosuvastatin (CRESTOR) 10 mg tablet Take 1 Tab by mouth nightly. 5/6/21  Yes Bandar Nails MD   fludrocortisone (FLORINEF) 0.1 mg tablet Take 1 Tab by mouth two (2) times a day. 5/3/21  Yes Bandar Nails MD   aspirin delayed-release 81 mg tablet Take 81 mg by mouth daily. Yes Provider, Historical   clopidogreL (PLAVIX) 75 mg tab Take 1 Tab by mouth daily. 12/30/20  Yes Ofelia Harris MD   acetaminophen (TYLENOL) 325 mg tablet 650 mg as needed. 8/13/20  Yes Provider, Historical   ferrous sulfate 325 mg (65 mg iron) tablet TAKE 1 TABLET BY MOUTH EVERY DAY AT [facility] 3/4/21 8/10/21  Provider, Historical   ciprofloxacin HCl (Cipro) 500 mg tablet Take 500 mg by mouth two (2) times a day. PT 1st Preemption prescribed  8/10/21  Provider, Historical   Insulin Needles, Disposable, 32 gauge x 5/32\" ndle Use twice a day for insulin  Patient not taking: Reported on 8/10/2021 5/14/21   Mercedes Levine MD   amoxicillin (AMOXIL) 875 mg tablet Take 875 mg by mouth two (2) times a day.   Patient not taking: Reported on 6/8/2021  8/10/21  Provider, Historical   flash glucose sensor (FreeStyle Abel 14 Day Sensor) kit CHECK BLOOD GLUCOSE 4 TIMES DAILY  Patient not taking: Reported on 8/10/2021 3/12/21 8/10/21  Taina Vaughn MD   flash glucose sensor (FreeStyle GANESH [facility] 2 Sensor) kit Check blood glucose 4 x daily  Patient not taking: Reported on 8/10/2021 1/5/21   Taina Vaughn MD   flash glucose scanning reader [facility] 14 Day [place]) misc Check blood glucose 4 x a day  Patient not taking: Reported on 8/10/2021 12/14/20 8/10/21  Taina Vaughn MD   Novofine Autocover 30 gauge x 1/3\" ndle TAKE INSULIN AS PRESCRIBED  Patient not taking: Reported on 8/10/2021 9/15/20   Provider, Historical   Insulin Needles, Disposable, 30 gauge x 1/3\" Take insulin as prescribed  Patient not taking: Reported on 8/10/2021 9/15/20   Taina Vaughn MD     Allergies   Allergen Reactions    Latex Swelling    Banana Swelling       Patient Active Problem List   Diagnosis Code    History of ankle surgery Z98.890    Peripheral arterial occlusive disease ([address] I77.9    Diabetes mellitus ([address] E11.9    H/O carotid angioplasty Z98.62    History of common carotid artery stent placement Z98.890, C56.299    Arterial ischemic stroke, MCA, right, remote, resolved Z86.73    Brain injury ([address] S06. 9X9A    Type 2 diabetes mellitus with peripheral vascular disease ([address] E11.51    Hypotension I95.9    PAD (peripheral artery disease) ([facility]) I73.9    TBI (traumatic brain injury) ([address] S06. 9X5A    CVA (cerebral vascular accident) ([address] I63.9    Diabetes ([address] E11.9    Stroke [facility]) I63.9     Patient Active Problem List    Diagnosis Date Noted    Hypotension     PAD (peripheral artery disease) ([address]     TBI (traumatic brain injury) ([address]     CVA (cerebral vascular accident) ([address]     Diabetes ([address]     Stroke ([address]     Type 2 diabetes mellitus with peripheral vascular disease ([address] 09/29/2020    History of ankle surgery 09/02/2020    [facility] 09/02/2020    Diabetes mellitus ([facility] 09/02/2020    H/O carotid angioplasty 09/02/2020    History of common carotid artery stent placement 09/02/2020    Arterial ischemic stroke, MCA, right, remote, resolved 09/02/2020    Brain injury ([facility] 09/02/2020     Current Outpatient Medications   Medication Sig Dispense Refill    ferrous sulfate 325 mg (65 mg iron) tablet Take 1 Tablet by mouth daily (with dinner). 90 Tablet 5    glipiZIDE (GLUCOTROL) 5 mg tablet Take 1 Tablet by mouth two (2) times a day. (Patient taking differently: Take 5 mg by mouth daily. ) 180 Tablet 1    metFORMIN (GLUCOPHAGE) 850 mg tablet . 60 Tablet 5    insulin glargine (LANTUS,BASAGLAR) 100 unit/mL (3 mL) inpn 10 Units by SubCUTAneous route two (2) times a day. 3 Adjustable Dose Pre-filled Pen Syringe 5    rosuvastatin (CRESTOR) 10 mg tablet Take 1 Tab by mouth nightly. 30 Tab 5    fludrocortisone (FLORINEF) 0.1 mg tablet Take 1 Tab by mouth two (2) times a day. 180 Tab 3    aspirin delayed-release 81 mg tablet Take 81 mg by mouth daily.  clopidogreL (PLAVIX) 75 mg tab Take 1 Tab by mouth daily. 90 Tab 1    acetaminophen (TYLENOL) 325 mg tablet 650 mg as needed.       Insulin Needles, Disposable, 32 gauge x 5/32\" ndle Use twice a day for insulin (Patient not taking: Reported on 8/10/2021) 100 Pen Needle 5    flash glucose sensor (FreeStyle Abel 2 Sensor) kit Check blood glucose 4 x daily (Patient not taking: Reported on 8/10/2021) 1 Kit 5    Novofine Autocover 30 gauge x 1/3\" ndle TAKE INSULIN AS PRESCRIBED (Patient not taking: Reported on 8/10/2021)      Insulin Needles, Disposable, 30 gauge x 1/3\" Take insulin as prescribed (Patient not taking: Reported on 8/10/2021) 100 Pen Needle 11     Allergies   Allergen Reactions    Latex Swelling    Banana Swelling     Past Medical History:   Diagnosis Date    Arterial ischemic stroke, MCA, right, remote, resolved 08/07/2020    Right MCA CVA 8/7/20 --> stenting of occluded YUDY    [facility]cere[facility]) [facility]     Right MCA CVA 8/7/20 --> stenting of occluded YUDY    [facility]     H/O carotid angioplasty     RIGHT ICA stenting 20    History of common carotid artery stent placement     RIGHT ICA stenting 20    Hypotension     PAD (peripheral artery disease) ([facility])     right LE vascular intervention in distal past    Stroke [facility]     TBI (traumatic brain injury) ([facility]     TBI in  ([facility])     Urinary retention      Past Surgical History:   Procedure Laterality Date    VASCULAR SURGERY PROCEDURE UNLIST  2020    Carotid Dopler     Family History   Problem Relation Age of Onset    Cancer Mother     Diabetes Mother     Diabetes Father      Social History     Tobacco Use    Smoking status: Former Smoker     Types: Cigarettes     Quit date:      Years since quittin.6    Smokeless tobacco: Never Used   Substance Use Topics    Alcohol use: Never       Review of Systems   Constitutional: Negative. HENT: Negative. Eyes: Negative. Respiratory: Negative. Cardiovascular: Negative. Gastrointestinal: Negative. Genitourinary: Negative. Musculoskeletal: Negative. Skin: Negative. Neurological: Negative. Endo/Heme/Allergies: Negative. Psychiatric/Behavioral: Negative. Objective:     No flowsheet data found.      [INSTRUCTIONS:  \"[x]\" Indicates a positive item  \"[]\" Indicates a negative item  -- DELETE ALL ITEMS NOT EXAMINED]    Constitutional: [x] Appears well-developed and well-nourished [x] No apparent distress      [] Abnormal -     Mental status: [x] Alert and awake  [x] Oriented to person/place/time [x] Able to follow commands    [] Abnormal -     Eyes:   EOM    [x]  Normal    [] Abnormal -   Sclera  [x]  Normal    [] Abnormal -          Discharge [x]  None visible   [] Abnormal -     HENT: [x] Normocephalic, atraumatic  [] Abnormal -   [x] Mouth/Throat: Mucous membranes are moist    External Ears [x] Normal  [] Abnormal -    Neck: [x] No visualized mass [] Abnormal -     Pulmonary/Chest: [x] Respiratory effort normal   [x] No visualized signs of difficulty breathing or respiratory distress        [] Abnormal -      Musculoskeletal:   [x] Normal gait with no signs of ataxia         [x] Normal range of motion of neck        [] Abnormal -     Neurological:        [x] No Facial Asymmetry (Cranial nerve 7 motor function) (limited exam due to video visit)          [x] No gaze palsy        [] Abnormal -          Skin:        [x] No significant exanthematous lesions or discoloration noted on facial skin         [] Abnormal -            Psychiatric:       [x] Normal Affect [] Abnormal -        [x] No Hallucinations    Other pertinent observable physical exam findings:-    We discussed the expected course, resolution and complications of the diagnosis(es) in detail. Medication risks, benefits, costs, interactions, and alternatives were discussed as indicated. I advised him to contact the office if his condition worsens, changes or fails to improve as anticipated. He expressed understanding with the diagnosis(es) and plan. On this date 08/10/2021 I have spent 25-30 minutes reviewing previous notes, test results and face to face with the patient discussing the diagnosis and importance of compliance with the treatment plan as well as documenting on the day of the visit. Gigi Hanson is a 57 y.o. male  is being evaluated by a Virtual Visit (video visit) encounter to address concerns as mentioned above. A caregiver was present when appropriate. Due to this being a TeleHealth encounter (During YBRSX-95 public health emergency), evaluation of the following organ systems was limited: Vitals/Constitutional/EENT/Resp/CV/GI//MS/Neuro/Skin/Heme-Lymph-Imm.   Pursuant to the emergency declaration under the [facility] waiver authority and the [employer] Act, this Virtual Visit was conducted with patient's (and/or legal guardian's) consent, to reduce the patient's risk of exposure to COVID-19 and provide necessary medical care. The patient (and/or legal guardian) has also been advised to contact this office for worsening conditions or problems, and seek emergency medical treatment and/or call 911 if deemed necessary. Patient identification was verified at the start of the visit: YES    Services were provided through a video synchronous discussion virtually to substitute for in-person clinic visit. Patient and provider were located at their individual homes. An electronic signature was used to authenticate this note.       Renetta Brown MD

## 2021-08-30 ENCOUNTER — TELEPHONE (OUTPATIENT)
Dept: FAMILY MEDICINE CLINIC | Age: 61
End: 2021-08-30

## 2021-10-01 DIAGNOSIS — E11.8 CONTROLLED TYPE 2 DIABETES MELLITUS WITH COMPLICATION, WITH LONG-TERM CURRENT USE OF INSULIN (HCC): ICD-10-CM

## 2021-10-01 DIAGNOSIS — Z79.4 CONTROLLED TYPE 2 DIABETES MELLITUS WITH COMPLICATION, WITH LONG-TERM CURRENT USE OF INSULIN (HCC): ICD-10-CM

## 2021-10-04 RX ORDER — METFORMIN HYDROCHLORIDE 850 MG/1
TABLET ORAL
Qty: 180 TABLET | Refills: 1 | Status: SHIPPED | OUTPATIENT
Start: 2021-10-04

## 2021-10-05 ENCOUNTER — TELEPHONE (OUTPATIENT)
Dept: FAMILY MEDICINE CLINIC | Age: 61
End: 2021-10-05

## 2021-10-05 NOTE — TELEPHONE ENCOUNTER
I do not see any notes that our office contacted them about paperwork. We do not have their forms. If it is a preop physical he needs to come in, in person to the office. What type of surgery is he having? Please verify above information for scheduling.   Thanks

## 2021-10-05 NOTE — TELEPHONE ENCOUNTER
Is there a time slot that you would like me to add pt to. Please advise         ----- Message from Tahmina Donald sent at 10/5/2021  8:17 AM EDT -----  Regarding: Dr. Amy Mendoza  Appointment not available    Caller's first and last name and relationship to patient (if not the patient):Man Mendoza(brother in law)      Best contact number:(c)[phone]      Preferred date and time:within the wk      Scheduled appointment date and time:      Reason for appointment:paperwork completed      Details to clarify the request:Pt's brother in law stated pt received a call to schedule an appt to have paperwork completed before surgery  on 11/04/21.       Tahmina Donald

## 2021-10-18 ENCOUNTER — OFFICE VISIT (OUTPATIENT)
Dept: FAMILY MEDICINE CLINIC | Age: 61
End: 2021-10-18
Payer: COMMERCIAL

## 2021-10-18 VITALS
RESPIRATION RATE: 16 BRPM | HEIGHT: 74 IN | TEMPERATURE: 97.8 F | OXYGEN SATURATION: 98 % | HEART RATE: 103 BPM | SYSTOLIC BLOOD PRESSURE: 109 MMHG | WEIGHT: 198 LBS | BODY MASS INDEX: 25.41 KG/M2 | DIASTOLIC BLOOD PRESSURE: 67 MMHG

## 2021-10-18 DIAGNOSIS — Z01.818 PREOP GENERAL PHYSICAL EXAM: Primary | ICD-10-CM

## 2021-10-18 DIAGNOSIS — E78.2 MIXED HYPERLIPIDEMIA: ICD-10-CM

## 2021-10-18 DIAGNOSIS — N13.8 BENIGN PROSTATIC HYPERPLASIA WITH URINARY OBSTRUCTION: ICD-10-CM

## 2021-10-18 DIAGNOSIS — E08.621 DIABETIC ULCER OF LEFT FOOT ASSOCIATED WITH DIABETES MELLITUS DUE TO UNDERLYING CONDITION, LIMITED TO BREAKDOWN OF SKIN, UNSPECIFIED PART OF FOOT (HCC): ICD-10-CM

## 2021-10-18 DIAGNOSIS — Z12.5 SCREENING FOR MALIGNANT NEOPLASM OF PROSTATE: ICD-10-CM

## 2021-10-18 DIAGNOSIS — Z79.4 CONTROLLED TYPE 2 DIABETES MELLITUS WITH COMPLICATION, WITH LONG-TERM CURRENT USE OF INSULIN (HCC): ICD-10-CM

## 2021-10-18 DIAGNOSIS — N40.1 BENIGN PROSTATIC HYPERPLASIA WITH URINARY OBSTRUCTION: ICD-10-CM

## 2021-10-18 DIAGNOSIS — E11.8 CONTROLLED TYPE 2 DIABETES MELLITUS WITH COMPLICATION, WITH LONG-TERM CURRENT USE OF INSULIN (HCC): ICD-10-CM

## 2021-10-18 DIAGNOSIS — L97.521 DIABETIC ULCER OF LEFT FOOT ASSOCIATED WITH DIABETES MELLITUS DUE TO UNDERLYING CONDITION, LIMITED TO BREAKDOWN OF SKIN, UNSPECIFIED PART OF FOOT (HCC): ICD-10-CM

## 2021-10-18 DIAGNOSIS — E05.90 HYPERTHYROIDISM: ICD-10-CM

## 2021-10-18 PROCEDURE — 93000 ELECTROCARDIOGRAM COMPLETE: CPT | Performed by: FAMILY MEDICINE

## 2021-10-18 PROCEDURE — 99214 OFFICE O/P EST MOD 30 MIN: CPT | Performed by: FAMILY MEDICINE

## 2021-10-18 RX ORDER — ROSUVASTATIN CALCIUM 10 MG/1
10 TABLET, COATED ORAL
Qty: 90 TABLET | Refills: 1 | Status: SHIPPED | OUTPATIENT
Start: 2021-10-18

## 2021-10-18 RX ORDER — GLIPIZIDE 5 MG/1
5 TABLET ORAL DAILY
Qty: 90 TABLET | Refills: 1 | Status: SHIPPED | OUTPATIENT
Start: 2021-10-18

## 2021-10-18 RX ORDER — CLOPIDOGREL BISULFATE 75 MG/1
75 TABLET ORAL DAILY
Qty: 90 TABLET | Refills: 1 | Status: SHIPPED | OUTPATIENT
Start: 2021-10-18

## 2021-10-18 RX ORDER — AMOXICILLIN AND CLAVULANATE POTASSIUM 875; 125 MG/1; MG/1
TABLET, FILM COATED ORAL
COMMUNITY
Start: 2021-09-29

## 2021-10-18 NOTE — PROGRESS NOTES
Preoperative Evaluation    Date of Exam: 10/18/2021    June Lockett is a 57 y.o. male ([facility]:[birth year]) who presents for preoperative evaluation. Procedure/Surgery: Right foot third toe date of Procedure/Surgery is scheduled for November 4 by Dr. Ballard Cali he denies personal or family history of complication associated with general anesthesia. Denies history of abnormal bleeding or blood transfusion. he is able to climb a flight of stairs without chest pain or severe SOB. Recent use of: ASA and Plavix. Tetanus up to date: last tetanus booster within 10 years. Patient has latex allergy. Allergies- reviewed: Allergies   Allergen Reactions    Latex Swelling    Banana Swelling         Medications- reviewed:   Current Outpatient Medications   Medication Sig    clopidogreL (PLAVIX) 75 mg tab Take 1 Tablet by mouth daily.  rosuvastatin (CRESTOR) 10 mg tablet Take 1 Tablet by mouth nightly.  glipiZIDE (GLUCOTROL) 5 mg tablet Take 1 Tablet by mouth daily.  metFORMIN (GLUCOPHAGE) 850 mg tablet TAKE 1 TABLET BY MOUTH TWICE A DAY WITH MEALS    ferrous sulfate 325 mg (65 mg iron) tablet Take 1 Tablet by mouth daily (with dinner).  insulin glargine (LANTUS,BASAGLAR) 100 unit/mL (3 mL) inpn 10 Units by SubCUTAneous route two (2) times a day.  fludrocortisone (FLORINEF) 0.1 mg tablet Take 1 Tab by mouth two (2) times a day.  aspirin delayed-release 81 mg tablet Take 81 mg by mouth daily.  acetaminophen (TYLENOL) 325 mg tablet 650 mg as needed.     amoxicillin-clavulanate (AUGMENTIN) 875-125 mg per tablet COMPLETED COURSE (Patient not taking: Reported on 10/18/2021)    Insulin Needles, Disposable, 32 gauge x 5/32\" ndle Use twice a day for insulin (Patient not taking: Reported on 8/10/2021)    flash glucose sensor (FreeStyle Abel 2 Sensor) kit Check blood glucose 4 x daily (Patient not taking: Reported on 8/10/2021)    Novofine Autocover 30 gauge x 1/3\" ndle TAKE INSULIN AS PRESCRIBED (Patient not taking: Reported on 8/10/2021)    Insulin Needles, Disposable, 30 gauge x 1/3\" Take insulin as prescribed (Patient not taking: Reported on 8/10/2021)     No current facility-administered medications for this visit.          Past Medical History- reviewed:  Past Medical History:   Diagnosis Date    Arterial ischemic stroke, MCA, right, remote, resolved 2020    Right MCA CVA 20 --> stenting of occluded YUDY    [facility] (cere[facility]     Right MCA CVA 20 --> stenting of occluded YUDY    Diabetes ([facility]     H/O carotid angioplasty     RIGHT ICA stenting 20    History of common carotid artery stent placement     RIGHT ICA stenting 20    Hypotension     [facility])     right LE vascular intervention in distal past    Stroke ([facility]     TBI (traumatic brain injury) ([facility]     TBI in  ([facility])     Urinary retention          Past Surgical History- reviewed:   Past Surgical History:   Procedure Laterality Date    VASCULAR SURGERY PROCEDURE UNLIST  2020    Carotid Dopler         Social History- reviewed:  Social History     Socioeconomic History    Marital status: SINGLE     Spouse name: Not on file    Number of children: Not on file    Years of education: Not on file    Highest education level: Not on file   Occupational History    Not on file   Tobacco Use    Smoking status: Former Smoker     Types: Cigarettes     Quit date: 2016     Years since quittin.8    Smokeless tobacco: Never Used   Vaping Use    Vaping Use: Never used   Substance and Sexual Activity    Alcohol use: Never    Drug use: Never    Sexual activity: Not Currently   Other Topics Concern    Not on file   Social History Narrative    Not on file     Social Determinants of Health     Financial Resource Strain:     Difficulty of Paying Living Expenses:    Food Insecurity:     Worried About Running Out of Food in the Last Year:     [address] in the Last Year:    Transportation Needs:     Lack of Transportation (Medical):  Lack of Transportation (Non-Medical):    Physical Activity:     Days of Exercise per Week:     Minutes of Exercise per Session:    Stress:     Feeling of Stress :    Social Connections:     Frequency of Communication with Friends and Family:     Frequency of Social Gatherings with Friends and Family:     Attends Christianity Services:     Active Member of Clubs or Organizations:     Attends Club or Organization Meetings:     Marital Status:    Intimate Partner Violence:     Fear of Current or Ex-Partner:     Emotionally Abused:     Physically Abused:     Sexually Abused:          Immunizations- reviewed:   Immunization History   Administered Date(s) Administered    COVID-19, PFIZER, MRNA, LNP-S, PF, 30MCG/0.3ML DOSE 04/12/2021, 05/03/2021    Influenza Vaccine (Quadrivalent)(>18 Yrs [place] [address]) 10/12/2020    Td 08/03/2015     Flu: Not UTD    Review of Systems   Constitutional: Negative. HENT: Negative. Eyes: Negative. Respiratory: Negative. Cardiovascular: Negative. Gastrointestinal: Negative. Genitourinary: Negative. Musculoskeletal: Negative. Skin: Negative. Neurological: Negative. Endo/Heme/Allergies: Negative. Psychiatric/Behavioral: Negative. EXAM:   Visit Vitals  /67 (BP 1 Location: Right arm, BP Patient Position: Sitting, BP Cuff Size: Adult)   Pulse (!) 103   Temp 97.8 °F (36.6 °C) (Temporal)   Resp 16   Ht 6' 2\" (1.88 m)   Wt 198 lb (89.8 kg)   SpO2 98%   BMI 25.42 kg/m²     Physical Exam  Vitals and nursing note reviewed. HENT:      Head: Normocephalic and atraumatic. Right Ear: External ear normal.      Left Ear: External ear normal.      Nose: Nose normal.      Mouth/Throat:      Pharynx: No oropharyngeal exudate. Eyes:      Conjunctiva/sclera: Conjunctivae normal.   Cardiovascular:      Rate and Rhythm: Normal rate and regular rhythm.    Pulmonary: Effort: Pulmonary effort is normal.      Breath sounds: Normal breath sounds. Abdominal:      General: Bowel sounds are normal. There is no distension. Palpations: Abdomen is soft. Tenderness: There is no abdominal tenderness. Musculoskeletal:         General: Normal range of motion. Cervical back: Normal range of motion and neck supple. Right lower leg: No edema. Left lower leg: No edema. Lymphadenopathy:      Cervical: No cervical adenopathy. Skin:     General: Skin is warm and dry. Neurological:      Mental Status: He is alert. DIAGNOSTICS:   1. EKG: Done today  2. CXR: Ordered today  3. Labs: Ordered today      IMPRESSION:     Pt presents for preoperative evaluation for right foot third toe amputation to proximal joint. Patient is an acceptable candidate for surgery.         Orders Placed This Encounter    XR CHEST PA LAT     Standing Status:   Future     Standing Expiration Date:   11/18/2022    CBC WITH AUTOMATED DIFF     Standing Status:   Future     Standing Expiration Date:   71/35/7355    METABOLIC PANEL, COMPREHENSIVE     Standing Status:   Future     Standing Expiration Date:   10/18/2022    URINALYSIS W/ REFLEX CULTURE     Standing Status:   Future     Standing Expiration Date:   10/18/2022    HEMOGLOBIN A1C WITH EAG     Standing Status:   Future     Standing Expiration Date:   10/18/2022    MICROALBUMIN, UR, RAND W/ MICROALB/CREAT RATIO     Standing Status:   Future     Standing Expiration Date:   10/18/2022   Sarah Miles THYROID CASCADE PROFILE     Standing Status:   Future     Standing Expiration Date:   10/18/2022    LIPID PANEL     Standing Status:   Future     Standing Expiration Date:   10/18/2022    PSA SCREENING (SCREENING)     Standing Status:   Future     Standing Expiration Date:   10/18/2022    AMB POC EKG ROUTINE W/ 12 LEADS, INTER & REP     Order Specific Question:   Reason for Exam:     Answer:   PREOP    amoxicillin-clavulanate (AUGMENTIN) 875-125 mg per tablet     Sig: COMPLETED COURSE    clopidogreL (PLAVIX) 75 mg tab     Sig: Take 1 Tablet by mouth daily. Dispense:  90 Tablet     Refill:  1    rosuvastatin (CRESTOR) 10 mg tablet     Sig: Take 1 Tablet by mouth nightly. Dispense:  90 Tablet     Refill:  1    glipiZIDE (GLUCOTROL) 5 mg tablet     Sig: Take 1 Tablet by mouth daily.      Dispense:  90 Tablet     Refill:  1         Jourdan Tripp MD

## 2021-10-18 NOTE — PROGRESS NOTES
Patient stated name &     Chief Complaint   Patient presents with    Surg H&P     Pre op Physcial       Surgery for right 2nd toe to be amputated        Health Maintenance Due   Topic    MICROALBUMIN Q1     Eye Exam Retinal or Dilated     DTaP/Tdap/Td series (1 - Tdap)    Colorectal Cancer Screening Combo     Shingrix Vaccine Age 50> (1 of 2)    Flu Vaccine (1)    Lipid Screen     Foot Exam Q1        Wt Readings from Last 3 Encounters:   10/18/21 198 lb (89.8 kg)   21 194 lb (88 kg)   21 181 lb (82.1 kg)     Temp Readings from Last 3 Encounters:   10/18/21 97.8 °F (36.6 °C) (Temporal)   20 97.7 °F (36.5 °C) (Oral)   10/27/20 97.3 °F (36.3 °C) (Oral)     BP Readings from Last 3 Encounters:   10/18/21 109/67   21 (!) 90/48   21 90/60     Pulse Readings from Last 3 Encounters:   10/18/21 (!) 103   21 88   20 (!) 126         Learning Assessment:  :     No flowsheet data found. Depression Screening:  :     3 most recent PHQ Screens 10/18/2021   Little interest or pleasure in doing things Not at all   Feeling down, depressed, irritable, or hopeless Not at all   Total Score PHQ 2 0       Fall Risk Assessment:  :     Fall Risk Assessment, last 12 mths 10/18/2021   Able to walk? Yes   Fall in past 12 months? 0   Do you feel unsteady? 0   Are you worried about falling 0   Is the gait abnormal? -   Number of falls in past 12 months -   Fall with injury? -       Abuse Screening:  :     Abuse Screening Questionnaire 10/18/2021 2021   Do you ever feel afraid of your partner? N N   Are you in a relationship with someone who physically or mentally threatens you? N N   Is it safe for you to go home? Y Y       Coordination of Care Questionnaire:  :     1) Have you been to an emergency room, urgent care clinic since your last visit? NO    Hospitalized since your last visit?  No             2) Have you seen or consulted any other health care providers outside of [facility] since your last visit? No  (Include any pap smears or colon screenings in this section.)    3) Do you have an Advance Directive on file? No    Patient is accompanied by Olya Guerrero I have received verbal consent from Cortney aHq to discuss any/all medical information while they are present in the room.

## 2021-10-19 ENCOUNTER — HOSPITAL ENCOUNTER (OUTPATIENT)
Dept: GENERAL RADIOLOGY | Age: 61
Discharge: HOME | End: 2021-10-19
Attending: FAMILY MEDICINE
Payer: COMMERCIAL

## 2021-10-19 DIAGNOSIS — Z01.818 PREOP GENERAL PHYSICAL EXAM: ICD-10-CM

## 2021-10-19 PROCEDURE — 71046 X-RAY EXAM CHEST 2 VIEWS: CPT

## 2021-10-21 LAB
INTERPRETIVE COMMENT, 010391: NORMAL
T4 FREE SERPL-MCNC: 1.31 NG/DL (ref 0.82–1.77)
TRIIODOTHYRONINE,FREE, 010392: 3.7 PG/ML (ref 2–4.4)
TSH SERPL-ACNC: 0.03 UIU/ML (ref 0.45–4.5)

## 2021-10-22 LAB
ALBUMIN SERPL-MCNC: 3.5 G/DL (ref 3.5–5)
ALBUMIN/GLOB SERPL: 0.9 {RATIO} (ref 1.1–2.2)
ALP SERPL-CCNC: 90 U/L (ref 45–117)
ALT SERPL-CCNC: 24 U/L (ref 12–78)
ANION GAP SERPL CALC-SCNC: 4 MMOL/L (ref 5–15)
AST SERPL-CCNC: 27 U/L (ref 15–37)
BASOPHILS # BLD: 0.1 K/UL (ref 0–0.1)
BASOPHILS NFR BLD: 2 % (ref 0–1)
BILIRUB SERPL-MCNC: 1.7 MG/DL (ref 0.2–1)
BUN SERPL-MCNC: 31 MG/DL (ref 6–20)
BUN/CREAT SERPL: 31 (ref 12–20)
CALCIUM SERPL-MCNC: 9 MG/DL (ref 8.5–10.1)
CHLORIDE SERPL-SCNC: 104 MMOL/L (ref 97–108)
CHOLEST SERPL-MCNC: 141 MG/DL
CO2 SERPL-SCNC: 30 MMOL/L (ref 21–32)
CREAT SERPL-MCNC: 0.99 MG/DL (ref 0.7–1.3)
DIFFERENTIAL METHOD BLD: ABNORMAL
EOSINOPHIL # BLD: 0.9 K/UL (ref 0–0.4)
EOSINOPHIL NFR BLD: 14 % (ref 0–7)
ERYTHROCYTE [DISTWIDTH] IN BLOOD BY AUTOMATED COUNT: 18.3 % (ref 11.5–14.5)
EST. AVERAGE GLUCOSE BLD GHB EST-MCNC: 151 MG/DL
GLOBULIN SER CALC-MCNC: 4.1 G/DL (ref 2–4)
GLUCOSE SERPL-MCNC: 133 MG/DL (ref 65–100)
HBA1C MFR BLD: 6.9 % (ref 4–5.6)
HCT VFR BLD AUTO: 32.9 % (ref 36.6–50.3)
HDLC SERPL-MCNC: 70 MG/DL
HDLC SERPL: 2 {RATIO} (ref 0–5)
HGB BLD-MCNC: 10.7 G/DL (ref 12.1–17)
IMM GRANULOCYTES # BLD AUTO: 0 K/UL (ref 0–0.04)
IMM GRANULOCYTES NFR BLD AUTO: 1 % (ref 0–0.5)
LDLC SERPL CALC-MCNC: 55.6 MG/DL (ref 0–100)
LYMPHOCYTES # BLD: 2 K/UL (ref 0.8–3.5)
LYMPHOCYTES NFR BLD: 31 % (ref 12–49)
MCH RBC QN AUTO: 28.8 PG (ref 26–34)
MCHC RBC AUTO-ENTMCNC: 32.5 G/DL (ref 30–36.5)
MCV RBC AUTO: 88.7 FL (ref 80–99)
MONOCYTES # BLD: 0.6 K/UL (ref 0–1)
MONOCYTES NFR BLD: 9 % (ref 5–13)
NEUTS SEG # BLD: 2.9 K/UL (ref 1.8–8)
NEUTS SEG NFR BLD: 44 % (ref 32–75)
NRBC # BLD: 0 K/UL (ref 0–0.01)
NRBC BLD-RTO: 0 PER 100 WBC
PLATELET # BLD AUTO: 185 K/UL (ref 150–400)
PMV BLD AUTO: ABNORMAL FL (ref 8.9–12.9)
POTASSIUM SERPL-SCNC: 4.2 MMOL/L (ref 3.5–5.1)
PROT SERPL-MCNC: 7.6 G/DL (ref 6.4–8.2)
PSA SERPL-MCNC: 1.4 NG/ML (ref 0.01–4)
RBC # BLD AUTO: 3.71 M/UL (ref 4.1–5.7)
SODIUM SERPL-SCNC: 138 MMOL/L (ref 136–145)
TRIGL SERPL-MCNC: 77 MG/DL (ref ?–150)
VLDLC SERPL CALC-MCNC: 15.4 MG/DL
WBC # BLD AUTO: 6.5 K/UL (ref 4.1–11.1)

## 2021-10-25 ENCOUNTER — TELEPHONE (OUTPATIENT)
Dept: FAMILY MEDICINE CLINIC | Age: 61
End: 2021-10-25

## 2021-10-25 NOTE — TELEPHONE ENCOUNTER
Please fax my preop note from 10/18/2021 along with EKG, all recent lab results and chest x-ray to Dr. Flo Cornejo office. Please notify patient once forms are faxed.   Thanks

## 2021-12-02 ENCOUNTER — DOCUMENTATION ONLY (OUTPATIENT)
Dept: CARDIOLOGY CLINIC | Age: 61
End: 2021-12-02

## 2021-12-02 NOTE — PROGRESS NOTES
Fax received from [facility]; Dr. Rodney Bermudez requesting labs, office note.   Faxed to [phone] attn [id number].

## 2021-12-06 ENCOUNTER — OFFICE VISIT (OUTPATIENT)
Dept: CARDIOLOGY CLINIC | Age: 61
End: 2021-12-06
Payer: COMMERCIAL

## 2021-12-06 VITALS
HEART RATE: 78 BPM | WEIGHT: 201 LBS | DIASTOLIC BLOOD PRESSURE: 76 MMHG | RESPIRATION RATE: 16 BRPM | HEIGHT: 74 IN | BODY MASS INDEX: 25.8 KG/M2 | SYSTOLIC BLOOD PRESSURE: 110 MMHG | OXYGEN SATURATION: 96 %

## 2021-12-06 DIAGNOSIS — I77.9 PERIPHERAL ARTERIAL OCCLUSIVE DISEASE (HCC): ICD-10-CM

## 2021-12-06 DIAGNOSIS — I63.9 CEREBROVASCULAR ACCIDENT (CVA), UNSPECIFIED MECHANISM (HCC): Primary | ICD-10-CM

## 2021-12-06 PROCEDURE — 99214 OFFICE O/P EST MOD 30 MIN: CPT | Performed by: SPECIALIST

## 2021-12-06 NOTE — PROGRESS NOTES
Chief Complaint   Patient presents with    Follow-up    Other     PAD    Cerebrovascular Accident     Visit Vitals  /76 (BP 1 Location: Right arm, BP Patient Position: Sitting, BP Cuff Size: Adult)   Pulse 78   Resp 16   Ht 6' 2\" (1.88 m)   Wt 201 lb (91.2 kg)   BMI 25.81 kg/m²     Patient presents in office without complaint. 11/4 Outpatient Surgery at [facility] removal of toe.
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
## 2022-03-18 PROBLEM — S06.9XAA BRAIN INJURY (HCC): Status: ACTIVE | Noted: 2020-09-02

## 2022-03-18 PROBLEM — Z98.890 HISTORY OF COMMON CAROTID ARTERY STENT PLACEMENT: Status: ACTIVE | Noted: 2020-09-02

## 2022-03-18 PROBLEM — Z95.828 HISTORY OF COMMON CAROTID ARTERY STENT PLACEMENT: Status: ACTIVE | Noted: 2020-09-02

## 2022-03-19 PROBLEM — E11.9 DIABETES MELLITUS (HCC): Status: ACTIVE | Noted: 2020-09-02

## 2022-03-19 PROBLEM — E11.51 TYPE 2 DIABETES MELLITUS WITH PERIPHERAL VASCULAR DISEASE (HCC): Status: ACTIVE | Noted: 2020-09-29

## 2022-03-20 PROBLEM — Z98.62 H/O CAROTID ANGIOPLASTY: Status: ACTIVE | Noted: 2020-09-02

## 2022-03-20 PROBLEM — Z98.890 HISTORY OF ANKLE SURGERY: Status: ACTIVE | Noted: 2020-09-02

## 2022-03-20 PROBLEM — Z86.73: Status: ACTIVE | Noted: 2020-09-02

## 2022-03-20 PROBLEM — I77.9 PERIPHERAL ARTERIAL OCCLUSIVE DISEASE (HCC): Status: ACTIVE | Noted: 2020-09-02

## 2022-03-28 LAB — HBA1C MFR BLD HPLC: 9 %
